# Patient Record
Sex: MALE | Race: BLACK OR AFRICAN AMERICAN | URBAN - METROPOLITAN AREA
[De-identification: names, ages, dates, MRNs, and addresses within clinical notes are randomized per-mention and may not be internally consistent; named-entity substitution may affect disease eponyms.]

---

## 2017-03-06 ENCOUNTER — OFFICE VISIT (OUTPATIENT)
Dept: FAMILY MEDICINE CLINIC | Age: 47
End: 2017-03-06

## 2017-03-06 VITALS
HEART RATE: 81 BPM | TEMPERATURE: 98 F | RESPIRATION RATE: 16 BRPM | HEIGHT: 74 IN | BODY MASS INDEX: 33.3 KG/M2 | WEIGHT: 259.5 LBS | DIASTOLIC BLOOD PRESSURE: 98 MMHG | OXYGEN SATURATION: 96 % | SYSTOLIC BLOOD PRESSURE: 139 MMHG

## 2017-03-06 DIAGNOSIS — R73.9 ELEVATED BLOOD SUGAR: ICD-10-CM

## 2017-03-06 DIAGNOSIS — E78.2 MIXED HYPERLIPIDEMIA: ICD-10-CM

## 2017-03-06 DIAGNOSIS — I10 ESSENTIAL HYPERTENSION: ICD-10-CM

## 2017-03-06 DIAGNOSIS — Z00.00 ROUTINE GENERAL MEDICAL EXAMINATION AT A HEALTH CARE FACILITY: Primary | ICD-10-CM

## 2017-03-06 NOTE — MR AVS SNAPSHOT
Visit Information Date & Time Provider Department Dept. Phone Encounter #  
 3/6/2017  8:00 AM Sherwin Albert MD [address] [facility] [phone] [id number] Follow-up Instructions Return in about 1 year (around 3/6/2018). Upcoming Health Maintenance Date Due DTaP/Tdap/Td series (1 - Tdap) 7/31/1990 INFLUENZA AGE 9 TO ADULT 8/1/2016 Allergies as of 3/6/2017  Review Complete On: 3/6/2017 By: Sherwin Albert MD  
 No Known Allergies Current Immunizations  Never Reviewed No immunizations on file. Not reviewed this visit You Were Diagnosed With   
  
 Codes Comments Routine general medical examination at a health care facility    -  Primary ICD-10-CM: Z00.00 ICD-9-CM: V70.0 Essential hypertension     ICD-10-CM: I10 
ICD-9-CM: 401.9 Mixed hyperlipidemia     ICD-10-CM: E78.2 ICD-9-CM: 272.2 Elevated blood sugar     ICD-10-CM: R73.9 ICD-9-CM: 790.29 Vitals BP Pulse Temp Resp Height(growth percentile) Weight(growth percentile) (!) 139/98 (BP 1 Location: Left arm, BP Patient Position: Sitting) 81 98 °F (36.7 °C) (Oral) 16 6' 1.83\" (1.875 m) 259 lb 8 oz (117.7 kg) SpO2 BMI Smoking Status 96% 33.47 kg/m2 Never Smoker BMI and BSA Data Body Mass Index Body Surface Area  
 33.47 kg/m 2 2.48 m 2 Preferred Pharmacy Pharmacy Name Phone [id number] [place] [phone] Your Updated Medication List  
  
   
This list is accurate as of: 3/6/17  8:41 AM.  Always use your most recent med list.  
  
  
  
  
 lisinopril-hydroCHLOROthiazide 20-12.5 mg per tablet Commonly known as:  PRINZIDE, ZESTORETIC  
TAKE 1 TAB BY MOUTH DAILY. We Performed the Following CBC WITH AUTOMATED DIFF [23386 CPT(R)] HEMOGLOBIN A1C WITH EAG [92273 CPT(R)] LIPID PANEL [86769 CPT(R)] METABOLIC PANEL, COMPREHENSIVE [67838 CPT(R)] PROSTATE SPECIFIC AG (PSA) [id number] CPT(R)] TSH 3RD GENERATION [74675 CPT(R)] Follow-up Instructions Return in about 1 year (around 3/6/2018). Introducing [facility]! [employer] introduces Are You a Human patient portal. Now you can access parts of your medical record, email your doctor's office, and request medication refills online. 1. In your internet browser, go to [email or web address] 2. Click on the First Time User? Click Here link in the Sign In box. You will see the New Member Sign Up page. 3. Enter your Are You a Human Access Code exactly as it appears below. You will not need to use this code after youve completed the sign-up process. If you do not sign up before the expiration date, you must request a new code. · Are You a Human Access Code: [email or web address] Expires: 6/4/2017  8:27 AM 
 
4. Enter the last four digits of your Social Security Number (xxxx) and Date of Birth (mm/dd/yyyy) as indicated and click Submit. You will be taken to the next sign-up page. 5. Create a Are You a Human ID. This will be your Are You a Human login ID and cannot be changed, so think of one that is secure and easy to remember. 6. Create a Are You a Human password. You can change your password at any time. 7. Enter your Password Reset Question and Answer. This can be used at a later time if you forget your password. 8. Enter your e-mail address. You will receive e-mail notification when new information is available in [address] 9. Click Sign Up. You can now view and download portions of your medical record. 10. Click the Download Summary menu link to download a portable copy of your medical information. If you have questions, please visit the Frequently Asked Questions section of the Are You a Human website. Remember, Are You a Human is NOT to be used for urgent needs. For medical emergencies, dial 911. Now available from your iPhone and Android! Please provide this summary of care documentation to your next provider. Your primary care clinician is listed as TERRIE DIETRICH. If you have any questions after today's visit, please call [phone].

## 2017-03-06 NOTE — PROGRESS NOTES
Patient is here for a physical and f/u on b/p. Visit Vitals    BP (!) 139/98 (BP 1 Location: Left arm, BP Patient Position: Sitting)    Pulse 81    Temp 98 °F (36.7 °C) (Oral)    Resp 16    Ht 6' 1.83\" (1.875 m)    Wt 259 lb 8 oz (117.7 kg)    SpO2 96%    BMI 33.47 kg/m2     1. Have you been to the ER, urgent care clinic since your last visit? Hospitalized since your last visit? No    2. Have you seen or consulted any other health care providers outside of the [address] [place] [address] since your last visit? Include any pap smears or colon screening. No       Chief Complaint   Patient presents with    Complete Physical     he is a 51y.o. year old male who presents for evalution. Reviewed PmHx, RxHx, FmHx, SocHx, AllgHx and updated and dated in the chart.     Patient Active Problem List    Diagnosis    Elevated blood sugar    HTN (hypertension)    Mixed hyperlipidemia       Review of Systems - negative except as listed above in the HPI    Objective:     Vitals:    03/06/17 0825   BP: (!) 139/98   Pulse: 81   Resp: 16   Temp: 98 °F (36.7 °C)   TempSrc: Oral   SpO2: 96%   Weight: 259 lb 8 oz (117.7 kg)   Height: 6' 1.83\" (1.875 m)     Physical Examination: General appearance - alert, well appearing, and in no distress  Neck - supple, no significant adenopathy  Chest - clear to auscultation, no wheezes, rales or rhonchi, symmetric air entry  Heart - normal rate, regular rhythm, normal S1, S2, no murmurs, rubs, clicks or gallops  Abdomen - soft, nontender, nondistended, no masses or organomegaly  Extremities - peripheral pulses normal, no pedal edema, no clubbing or cyanosis    Assessment/ Plan:   Marli Portillo was seen today for complete physical.    Diagnoses and all orders for this visit:    Routine general medical examination at a health care facility  -     PROSTATE SPECIFIC AG  -     LIPID PANEL  -     METABOLIC PANEL, COMPREHENSIVE  -     CBC WITH AUTOMATED DIFF  -     TSH 3RD GENERATION    Essential hypertension  -     LIPID PANEL  -     METABOLIC PANEL, COMPREHENSIVE  - inc today  -check BP at home  -dwp dec salt in diet    Mixed hyperlipidemia  -     LIPID PANEL  -     METABOLIC PANEL, COMPREHENSIVE    Elevated blood sugar  -     HEMOGLOBIN A1C WITH EAG       -Patient is in good health  -Discussed with patient cancer risk factors and screens needed  -Patient needs a colonoscopy no  -Labs from previous visits were discussed with patient yes  -Discussed with patient diet and exercise=yes  -Discussed with patient testicular (male)/breast self exam (female)= yes    Follow-up Disposition:  Return in about 1 year (around 3/6/2018). I have discussed the diagnosis with the patient and the intended plan as seen in the above orders. The patient understands and agrees with the plan. The patient has received an after-visit summary and questions were answered concerning future plans. Medication Side Effects and Warnings were discussed with patient  Patient Labs were reviewed and or requested  Patient Past Records were reviewed and or requested     There are no Patient Instructions on file for this visit.         Christine Pop M.D.

## 2017-03-07 LAB
ALBUMIN SERPL-MCNC: 4 G/DL (ref 3.5–5.5)
ALBUMIN/GLOB SERPL: 1.3 {RATIO} (ref 1.1–2.5)
ALP SERPL-CCNC: 118 IU/L (ref 39–117)
ALT SERPL-CCNC: 35 IU/L (ref 0–44)
AST SERPL-CCNC: 24 IU/L (ref 0–40)
BASOPHILS # BLD AUTO: 0 X10E3/UL (ref 0–0.2)
BASOPHILS NFR BLD AUTO: 0 %
BILIRUB SERPL-MCNC: 0.3 MG/DL (ref 0–1.2)
BUN SERPL-MCNC: 13 MG/DL (ref 6–24)
BUN/CREAT SERPL: 11 (ref 9–20)
CALCIUM SERPL-MCNC: 9.1 MG/DL (ref 8.7–10.2)
CHLORIDE SERPL-SCNC: 103 MMOL/L (ref 96–106)
CHOLEST SERPL-MCNC: 226 MG/DL (ref 100–199)
CO2 SERPL-SCNC: 24 MMOL/L (ref 18–29)
CREAT SERPL-MCNC: 1.23 MG/DL (ref 0.76–1.27)
EOSINOPHIL # BLD AUTO: 0.1 X10E3/UL (ref 0–0.4)
EOSINOPHIL NFR BLD AUTO: 1 %
ERYTHROCYTE [DISTWIDTH] IN BLOOD BY AUTOMATED COUNT: 13.4 % (ref 12.3–15.4)
EST. AVERAGE GLUCOSE BLD GHB EST-MCNC: 128 MG/DL
GLOBULIN SER CALC-MCNC: 3 G/DL (ref 1.5–4.5)
GLUCOSE SERPL-MCNC: 117 MG/DL (ref 65–99)
HBA1C MFR BLD: 6.1 % (ref 4.8–5.6)
HCT VFR BLD AUTO: 40.4 % (ref 37.5–51)
HDLC SERPL-MCNC: 47 MG/DL
HGB BLD-MCNC: 13.3 G/DL (ref 12.6–17.7)
IMM GRANULOCYTES # BLD: 0 X10E3/UL (ref 0–0.1)
IMM GRANULOCYTES NFR BLD: 0 %
INTERPRETATION, 910389: NORMAL
LDLC SERPL CALC-MCNC: 156 MG/DL (ref 0–99)
LYMPHOCYTES # BLD AUTO: 2.4 X10E3/UL (ref 0.7–3.1)
LYMPHOCYTES NFR BLD AUTO: 34 %
MCH RBC QN AUTO: 27.9 PG (ref 26.6–33)
MCHC RBC AUTO-ENTMCNC: 32.9 G/DL (ref 31.5–35.7)
MCV RBC AUTO: 85 FL (ref 79–97)
MONOCYTES # BLD AUTO: 0.6 X10E3/UL (ref 0.1–0.9)
MONOCYTES NFR BLD AUTO: 9 %
NEUTROPHILS # BLD AUTO: 4 X10E3/UL (ref 1.4–7)
NEUTROPHILS NFR BLD AUTO: 56 %
PLATELET # BLD AUTO: 245 X10E3/UL (ref 150–379)
POTASSIUM SERPL-SCNC: 4.1 MMOL/L (ref 3.5–5.2)
PROT SERPL-MCNC: 7 G/DL (ref 6–8.5)
PSA SERPL-MCNC: 0.7 NG/ML (ref 0–4)
RBC # BLD AUTO: 4.77 X10E6/UL (ref 4.14–5.8)
SODIUM SERPL-SCNC: 144 MMOL/L (ref 134–144)
TRIGL SERPL-MCNC: 114 MG/DL (ref 0–149)
TSH SERPL DL<=0.005 MIU/L-ACNC: 2.42 UIU/ML (ref 0.45–4.5)
VLDLC SERPL CALC-MCNC: 23 MG/DL (ref 5–40)
WBC # BLD AUTO: 7.2 X10E3/UL (ref 3.4–10.8)

## 2017-12-02 DIAGNOSIS — I10 ESSENTIAL HYPERTENSION: ICD-10-CM

## 2017-12-04 RX ORDER — LISINOPRIL AND HYDROCHLOROTHIAZIDE 12.5; 2 MG/1; MG/1
TABLET ORAL
Qty: 90 TAB | Refills: 3 | Status: SHIPPED | OUTPATIENT
Start: 2017-12-04 | End: 2018-12-17 | Stop reason: SDUPTHER

## 2018-12-17 DIAGNOSIS — I10 ESSENTIAL HYPERTENSION: ICD-10-CM

## 2018-12-17 RX ORDER — LISINOPRIL AND HYDROCHLOROTHIAZIDE 12.5; 2 MG/1; MG/1
TABLET ORAL
Qty: 90 TAB | Refills: 3 | Status: SHIPPED | OUTPATIENT
Start: 2018-12-17 | End: 2020-03-17

## 2018-12-17 RX ORDER — LISINOPRIL AND HYDROCHLOROTHIAZIDE 12.5; 2 MG/1; MG/1
TABLET ORAL
Qty: 90 TAB | Refills: 3 | Status: SHIPPED | OUTPATIENT
Start: 2018-12-17 | End: 2019-04-14 | Stop reason: SDUPTHER

## 2019-05-23 ENCOUNTER — OFFICE VISIT (OUTPATIENT)
Dept: FAMILY MEDICINE CLINIC | Age: 49
End: 2019-05-23

## 2019-05-23 VITALS
OXYGEN SATURATION: 94 % | TEMPERATURE: 98 F | RESPIRATION RATE: 18 BRPM | DIASTOLIC BLOOD PRESSURE: 80 MMHG | BODY MASS INDEX: 34.93 KG/M2 | SYSTOLIC BLOOD PRESSURE: 123 MMHG | WEIGHT: 257.9 LBS | HEART RATE: 97 BPM | HEIGHT: 72 IN

## 2019-05-23 DIAGNOSIS — M54.5 ACUTE LOW BACK PAIN, UNSPECIFIED BACK PAIN LATERALITY, WITH SCIATICA PRESENCE UNSPECIFIED: ICD-10-CM

## 2019-05-23 DIAGNOSIS — E78.2 MIXED HYPERLIPIDEMIA: ICD-10-CM

## 2019-05-23 DIAGNOSIS — R73.9 ELEVATED BLOOD SUGAR: Primary | ICD-10-CM

## 2019-05-23 DIAGNOSIS — I10 ESSENTIAL HYPERTENSION: ICD-10-CM

## 2019-05-23 RX ORDER — NAPROXEN 500 MG/1
500 TABLET ORAL 2 TIMES DAILY WITH MEALS
Qty: 60 TAB | Refills: 2 | Status: SHIPPED | OUTPATIENT
Start: 2019-05-23

## 2019-05-23 NOTE — PROGRESS NOTES
Pt presents in office for routine f/u and back pain. Due for labs        Chief Complaint   Patient presents with    Back Pain     He is a 51 y.o. male who presents for evalution. Reviewed PmHx, RxHx, FmHx, SocHx, AllgHx and updated and dated in the chart. Patient Active Problem List    Diagnosis    Elevated blood sugar    HTN (hypertension)    Mixed hyperlipidemia       Review of Systems - negative except as listed above in the HPI    Objective:     Vitals:    05/23/19 1436   BP: 123/80   Pulse: 97   Resp: 18   Temp: 98 °F (36.7 °C)   TempSrc: Oral   SpO2: 94%   Weight: 257 lb 14.4 oz (117 kg)   Height: 6' (1.829 m)     Physical Examination: General appearance - alert, well appearing, and in no distress  Neck - supple, no significant adenopathy  Chest - clear to auscultation, no wheezes, rales or rhonchi, symmetric air entry  Heart - normal rate, regular rhythm, normal S1, S2, no murmurs, rubs, clicks or gallops  Abdomen - soft, nontender, nondistended, no masses or organomegaly  Extremities - peripheral pulses normal, no pedal edema, no clubbing or cyanosis    Assessment/ Plan:   Diagnoses and all orders for this visit:    1. Elevated blood sugar  -     LIPID PANEL  -     METABOLIC PANEL, COMPREHENSIVE  -     HEMOGLOBIN A1C WITH EAG  -dwp diet and wt loss    2. Essential hypertension  -     LIPID PANEL  -     METABOLIC PANEL, COMPREHENSIVE  -at goal    3. Mixed hyperlipidemia  -     LIPID PANEL  -     METABOLIC PANEL, COMPREHENSIVE    4. Acute low back pain, unspecified back pain laterality, with sciatica presence unspecified  -     naproxen (NAPROSYN) 500 mg tablet; Take 1 Tab by mouth two (2) times daily (with meals). I have discussed the diagnosis with the patient and the intended plan as seen in the above orders. The patient understands and agrees with the plan. The patient has received an after-visit summary and questions were answered concerning future plans.      Medication Side Effects and Warnings were discussed with patient  Patient Labs were reviewed and or requested:  Patient Past Records were reviewed and or requested    Bedelia Schaumann, M.D. There are no Patient Instructions on file for this visit.

## 2019-05-24 LAB
ALBUMIN SERPL-MCNC: 4.5 G/DL (ref 3.5–5.5)
ALBUMIN/GLOB SERPL: 1.6 {RATIO} (ref 1.2–2.2)
ALP SERPL-CCNC: 106 IU/L (ref 39–117)
ALT SERPL-CCNC: 27 IU/L (ref 0–44)
AST SERPL-CCNC: 21 IU/L (ref 0–40)
BILIRUB SERPL-MCNC: 0.5 MG/DL (ref 0–1.2)
BUN SERPL-MCNC: 14 MG/DL (ref 6–24)
BUN/CREAT SERPL: 12 (ref 9–20)
CALCIUM SERPL-MCNC: 9.7 MG/DL (ref 8.7–10.2)
CHLORIDE SERPL-SCNC: 102 MMOL/L (ref 96–106)
CHOLEST SERPL-MCNC: 238 MG/DL (ref 100–199)
CO2 SERPL-SCNC: 21 MMOL/L (ref 20–29)
CREAT SERPL-MCNC: 1.18 MG/DL (ref 0.76–1.27)
EST. AVERAGE GLUCOSE BLD GHB EST-MCNC: 120 MG/DL
GLOBULIN SER CALC-MCNC: 2.8 G/DL (ref 1.5–4.5)
GLUCOSE SERPL-MCNC: 109 MG/DL (ref 65–99)
HBA1C MFR BLD: 5.8 % (ref 4.8–5.6)
HDLC SERPL-MCNC: 42 MG/DL
INTERPRETATION, 910389: NORMAL
LDLC SERPL CALC-MCNC: 166 MG/DL (ref 0–99)
POTASSIUM SERPL-SCNC: 3.8 MMOL/L (ref 3.5–5.2)
PROT SERPL-MCNC: 7.3 G/DL (ref 6–8.5)
SODIUM SERPL-SCNC: 138 MMOL/L (ref 134–144)
TRIGL SERPL-MCNC: 151 MG/DL (ref 0–149)
VLDLC SERPL CALC-MCNC: 30 MG/DL (ref 5–40)

## 2020-03-14 DIAGNOSIS — I10 ESSENTIAL HYPERTENSION: ICD-10-CM

## 2020-03-17 RX ORDER — LISINOPRIL AND HYDROCHLOROTHIAZIDE 12.5; 2 MG/1; MG/1
TABLET ORAL
Qty: 90 TAB | Refills: 3 | Status: SHIPPED | OUTPATIENT
Start: 2020-03-17 | End: 2021-05-27

## 2021-09-26 DIAGNOSIS — I10 ESSENTIAL HYPERTENSION: ICD-10-CM

## 2021-09-27 RX ORDER — LISINOPRIL AND HYDROCHLOROTHIAZIDE 12.5; 2 MG/1; MG/1
TABLET ORAL
Qty: 90 TABLET | Refills: 0 | Status: SHIPPED | OUTPATIENT
Start: 2021-09-27 | End: 2021-12-29

## 2022-01-06 ENCOUNTER — TELEPHONE (OUTPATIENT)
Dept: FAMILY MEDICINE CLINIC | Age: 52
End: 2022-01-06

## 2022-01-06 NOTE — TELEPHONE ENCOUNTER
----- Message from Juan José Dale sent at 1/6/2022 12:07 PM EST -----  Subject: Message to Provider    QUESTIONS  Information for Provider? Pt was positive for COVID 19 12/28/21, he is   wanting to know if he can get scheduled for another test to see if hes   negative so he can go back to work. Please contact him ASAP  ---------------------------------------------------------------------------  --------------  CALL BACK INFO  What is the best way for the office to contact you? OK to leave message on   voicemail  Preferred Call Back Phone Number? [phone]  ---------------------------------------------------------------------------  --------------  SCRIPT ANSWERS  Relationship to Patient?  Self

## 2022-01-24 ENCOUNTER — OFFICE VISIT (OUTPATIENT)
Dept: FAMILY MEDICINE CLINIC | Age: 52
End: 2022-01-24

## 2022-01-24 VITALS
TEMPERATURE: 99.1 F | SYSTOLIC BLOOD PRESSURE: 131 MMHG | DIASTOLIC BLOOD PRESSURE: 84 MMHG | BODY MASS INDEX: 34.54 KG/M2 | WEIGHT: 255 LBS | OXYGEN SATURATION: 96 % | RESPIRATION RATE: 16 BRPM | HEIGHT: 72 IN | HEART RATE: 81 BPM

## 2022-01-24 DIAGNOSIS — Z00.00 ROUTINE GENERAL MEDICAL EXAMINATION AT A HEALTH CARE FACILITY: Primary | ICD-10-CM

## 2022-01-24 DIAGNOSIS — R73.9 ELEVATED BLOOD SUGAR: ICD-10-CM

## 2022-01-24 DIAGNOSIS — I10 ESSENTIAL HYPERTENSION: ICD-10-CM

## 2022-01-24 DIAGNOSIS — E78.2 MIXED HYPERLIPIDEMIA: ICD-10-CM

## 2022-01-24 DIAGNOSIS — I10 PRIMARY HYPERTENSION: ICD-10-CM

## 2022-01-24 PROCEDURE — 99396 PREV VISIT EST AGE 40-64: CPT | Performed by: FAMILY MEDICINE

## 2022-01-24 RX ORDER — LISINOPRIL AND HYDROCHLOROTHIAZIDE 12.5; 2 MG/1; MG/1
TABLET ORAL
Qty: 90 TABLET | Refills: 3 | Status: SHIPPED | OUTPATIENT
Start: 2022-01-24 | End: 2022-02-04

## 2022-01-24 NOTE — PROGRESS NOTES
Chief Complaint   Patient presents with    Physical    Hypertension    Labs     Fasting today    Medication Refill     1. Have you been to the ER, urgent care clinic since your last visit? Hospitalized since your last visit? No    2. Have you seen or consulted any other health care providers outside of the [address] [place] [address] since your last visit? Include any pap smears or colon screening. No      Chief Complaint   Patient presents with    Physical    Hypertension    Labs     Fasting today    Medication Refill     he is a 45y.o. year old male who presents for evalution. Reviewed PmHx, RxHx, FmHx, SocHx, AllgHx and updated and dated in the chart. Patient Active Problem List    Diagnosis    Elevated blood sugar    HTN (hypertension)    Mixed hyperlipidemia       Review of Systems - negative except as listed above in the HPI    Objective:     Vitals:    01/24/22 1345   BP: 131/84   Pulse: 81   Resp: 16   Temp: 99.1 °F (37.3 °C)   TempSrc: Oral   SpO2: 96%   Weight: 255 lb (115.7 kg)   Height: 6' (1.829 m)     Physical Examination: General appearance - alert, well appearing, and in no distress  Chest - clear to auscultation, no wheezes, rales or rhonchi, symmetric air entry  Heart - normal rate, regular rhythm, normal S1, S2, no murmurs, rubs, clicks or gallops  Abdomen - soft, nontender, nondistended, no masses or organomegaly    Assessment/ Plan:   Diagnoses and all orders for this visit:    1. Routine general medical examination at a health care facility  -     LIPID PANEL; Future  -     METABOLIC PANEL, COMPREHENSIVE; Future  -     CBC WITH AUTOMATED DIFF; Future  -     TSH 3RD GENERATION; Future  -     HEMOGLOBIN A1C WITH EAG; Future  -     PSA W/ REFLX FREE PSA; Future  -see below    2. Primary hypertension  -     LIPID PANEL; Future  -     METABOLIC PANEL, COMPREHENSIVE; Future  -at goal    3. Mixed hyperlipidemia  -     LIPID PANEL;  Future  -     METABOLIC PANEL, COMPREHENSIVE; Future    4. Elevated blood sugar  -     METABOLIC PANEL, COMPREHENSIVE; Future  -     HEMOGLOBIN A1C WITH EAG; Future    5. Essential hypertension  -     lisinopril-hydroCHLOROthiazide (PRINZIDE, ZESTORETIC) 20-12.5 mg per tablet; TAKE 1 TABLET BY MOUTH EVERY DAY. -Patient is in good health  -Discussed with patient cancer risk factors and screens needed  -Colonoscopy was recommended based on current guidelines for screening.  -Labs from previous visits were discussed with patient yes      -Discussed with patient diet and exercise  -Immunizations appropriate for age were discussed with pt and updated  -    I have discussed the diagnosis with the patient and the intended plan as seen in the above orders. The patient understands and agrees with the plan. The patient has received an after-visit summary and questions were answered concerning future plans. Medication Side Effects and Warnings were discussed with patient  Patient Labs were reviewed and or requested  Patient Past Records were reviewed and or requested     There are no Patient Instructions on file for this visit.         Arnaud Avalos M.D.

## 2022-01-25 PROBLEM — E11.9 CONTROLLED TYPE 2 DIABETES MELLITUS WITHOUT COMPLICATION, WITHOUT LONG-TERM CURRENT USE OF INSULIN (HCC): Chronic | Status: ACTIVE | Noted: 2022-01-25

## 2022-01-25 LAB
ALBUMIN SERPL-MCNC: 4.4 G/DL (ref 3.8–4.9)
ALBUMIN/GLOB SERPL: 1.7 {RATIO} (ref 1.2–2.2)
ALP SERPL-CCNC: 142 IU/L (ref 44–121)
ALT SERPL-CCNC: 60 IU/L (ref 0–44)
AST SERPL-CCNC: 33 IU/L (ref 0–40)
BASOPHILS # BLD AUTO: 0 X10E3/UL (ref 0–0.2)
BASOPHILS NFR BLD AUTO: 1 %
BILIRUB SERPL-MCNC: 0.5 MG/DL (ref 0–1.2)
BUN SERPL-MCNC: 14 MG/DL (ref 6–24)
BUN/CREAT SERPL: 12 (ref 9–20)
CALCIUM SERPL-MCNC: 9.6 MG/DL (ref 8.7–10.2)
CHLORIDE SERPL-SCNC: 101 MMOL/L (ref 96–106)
CHOLEST SERPL-MCNC: 261 MG/DL (ref 100–199)
CO2 SERPL-SCNC: 27 MMOL/L (ref 20–29)
CREAT SERPL-MCNC: 1.19 MG/DL (ref 0.76–1.27)
EOSINOPHIL # BLD AUTO: 0.1 X10E3/UL (ref 0–0.4)
EOSINOPHIL NFR BLD AUTO: 1 %
ERYTHROCYTE [DISTWIDTH] IN BLOOD BY AUTOMATED COUNT: 12.9 % (ref 11.6–15.4)
EST. AVERAGE GLUCOSE BLD GHB EST-MCNC: 126 MG/DL
GLOBULIN SER CALC-MCNC: 2.6 G/DL (ref 1.5–4.5)
GLUCOSE SERPL-MCNC: 97 MG/DL (ref 65–99)
HBA1C MFR BLD: 6 % (ref 4.8–5.6)
HCT VFR BLD AUTO: 42.4 % (ref 37.5–51)
HDLC SERPL-MCNC: 43 MG/DL
HGB BLD-MCNC: 14.4 G/DL (ref 13–17.7)
IMM GRANULOCYTES # BLD AUTO: 0 X10E3/UL (ref 0–0.1)
IMM GRANULOCYTES NFR BLD AUTO: 0 %
IMP & REVIEW OF LAB RESULTS: NORMAL
LDLC SERPL CALC-MCNC: 188 MG/DL (ref 0–99)
LYMPHOCYTES # BLD AUTO: 2.4 X10E3/UL (ref 0.7–3.1)
LYMPHOCYTES NFR BLD AUTO: 33 %
MCH RBC QN AUTO: 29 PG (ref 26.6–33)
MCHC RBC AUTO-ENTMCNC: 34 G/DL (ref 31.5–35.7)
MCV RBC AUTO: 86 FL (ref 79–97)
MONOCYTES # BLD AUTO: 0.8 X10E3/UL (ref 0.1–0.9)
MONOCYTES NFR BLD AUTO: 11 %
NEUTROPHILS # BLD AUTO: 4 X10E3/UL (ref 1.4–7)
NEUTROPHILS NFR BLD AUTO: 54 %
PLATELET # BLD AUTO: 233 X10E3/UL (ref 150–450)
POTASSIUM SERPL-SCNC: 4.2 MMOL/L (ref 3.5–5.2)
PROT SERPL-MCNC: 7 G/DL (ref 6–8.5)
PSA SERPL-MCNC: 1.3 NG/ML (ref 0–4)
RBC # BLD AUTO: 4.96 X10E6/UL (ref 4.14–5.8)
REFLEX CRITERIA: NORMAL
SODIUM SERPL-SCNC: 140 MMOL/L (ref 134–144)
TRIGL SERPL-MCNC: 161 MG/DL (ref 0–149)
TSH SERPL-ACNC: 1.76 UIU/ML (ref 0.45–4.5)
VLDLC SERPL CALC-MCNC: 30 MG/DL (ref 5–40)
WBC # BLD AUTO: 7.3 X10E3/UL (ref 3.4–10.8)

## 2022-03-18 PROBLEM — R73.9 ELEVATED BLOOD SUGAR: Status: ACTIVE | Noted: 2017-03-06

## 2022-03-19 PROBLEM — E11.9 CONTROLLED TYPE 2 DIABETES MELLITUS WITHOUT COMPLICATION, WITHOUT LONG-TERM CURRENT USE OF INSULIN (HCC): Status: ACTIVE | Noted: 2022-01-25

## 2022-10-07 DIAGNOSIS — I10 ESSENTIAL HYPERTENSION: ICD-10-CM

## 2022-10-07 RX ORDER — LISINOPRIL AND HYDROCHLOROTHIAZIDE 12.5; 2 MG/1; MG/1
TABLET ORAL
Qty: 90 TABLET | Refills: 1 | Status: SHIPPED | OUTPATIENT
Start: 2022-10-07

## 2022-11-09 ENCOUNTER — TELEPHONE (OUTPATIENT)
Dept: FAMILY MEDICINE CLINIC | Age: 52
End: 2022-11-09

## 2022-11-09 NOTE — TELEPHONE ENCOUNTER
----- Message from Wayne Marques sent at 11/9/2022 11:08 AM EST -----  Subject: Referral Request    Reason for referral request? pt wants a referral to get an MRI to check   for an ansurism family has a history also has been having hip pain when he   walks   Provider patient wants to be referred to(if known):     Provider Phone Number(if known):     Additional Information for Provider?   ---------------------------------------------------------------------------  --------------  [email or web address]    [phone]; OK to leave message on voicemail, OK to respond with   electronic message via [employer] portal (only for patients who have   registered [employer] account)  ---------------------------------------------------------------------------  --------------

## 2022-11-22 ENCOUNTER — OFFICE VISIT (OUTPATIENT)
Dept: FAMILY MEDICINE CLINIC | Age: 52
End: 2022-11-22
Payer: COMMERCIAL

## 2022-11-22 VITALS
OXYGEN SATURATION: 95 % | HEIGHT: 72 IN | TEMPERATURE: 97.9 F | BODY MASS INDEX: 35.49 KG/M2 | RESPIRATION RATE: 18 BRPM | HEART RATE: 78 BPM | SYSTOLIC BLOOD PRESSURE: 138 MMHG | WEIGHT: 262 LBS | DIASTOLIC BLOOD PRESSURE: 89 MMHG

## 2022-11-22 DIAGNOSIS — M25.552 BILATERAL HIP PAIN: ICD-10-CM

## 2022-11-22 DIAGNOSIS — M25.551 BILATERAL HIP PAIN: ICD-10-CM

## 2022-11-22 DIAGNOSIS — E11.9 CONTROLLED TYPE 2 DIABETES MELLITUS WITHOUT COMPLICATION, WITHOUT LONG-TERM CURRENT USE OF INSULIN (HCC): Primary | ICD-10-CM

## 2022-11-22 DIAGNOSIS — Z82.49 FAMILY HISTORY OF CEREBRAL ANEURYSM: ICD-10-CM

## 2022-11-22 DIAGNOSIS — E78.2 MIXED HYPERLIPIDEMIA: ICD-10-CM

## 2022-11-22 DIAGNOSIS — E66.01 SEVERE OBESITY (BMI 35.0-39.9) WITH COMORBIDITY (HCC): ICD-10-CM

## 2022-11-22 DIAGNOSIS — I10 PRIMARY HYPERTENSION: ICD-10-CM

## 2022-11-22 PROCEDURE — 99214 OFFICE O/P EST MOD 30 MIN: CPT | Performed by: FAMILY MEDICINE

## 2022-11-22 PROCEDURE — 3079F DIAST BP 80-89 MM HG: CPT | Performed by: FAMILY MEDICINE

## 2022-11-22 PROCEDURE — 3074F SYST BP LT 130 MM HG: CPT | Performed by: FAMILY MEDICINE

## 2022-11-22 PROCEDURE — 3044F HG A1C LEVEL LT 7.0%: CPT | Performed by: FAMILY MEDICINE

## 2022-11-22 NOTE — PROGRESS NOTES
Chief Complaint   Patient presents with    Hip Pain     Bilat      Hypertension    Cholesterol Problem    Referral Request     MRI for family hx aneurism      Diabetes     1. Have you been to the ER, urgent care clinic since your last visit? Hospitalized since your last visit? No    2. Have you seen or consulted any other health care providers outside of the [address] [place] [address] since your last visit? Include any pap smears or colon screening. No    No results found for: Caryle Saltness, MCA2, MCA3, MCAU   Chief Complaint   Patient presents with    Hip Pain     Bilat      Hypertension    Cholesterol Problem    Referral Request     MRI for family hx aneurism      Diabetes     he is a 47y.o. year old male who presents for evaluation. See Diabetic Report Card listed above. Patient Active Problem List    Diagnosis    Controlled type 2 diabetes mellitus without complication, without long-term current use of insulin (HCC)    Elevated blood sugar    HTN (hypertension)    Mixed hyperlipidemia       Reviewed PmHx, RxHx, FmHx, SocHx, AllgHx--dated and updated in the chart. Review of Systems - negative except as listed above in the HPI    Objective:     Vitals:    11/22/22 0903   BP: 138/89   Pulse: 78   Resp: 18   Temp: 97.9 °F (36.6 °C)   SpO2: 95%   Weight: 262 lb (118.8 kg)   Height: 6' (1.829 m)         Assessment/ Plan:   Diagnoses and all orders for this visit:    1. Controlled type 2 diabetes mellitus without complication, without long-term current use of insulin (HCC)  -     MICROALBUMIN, UR, RAND W/ MICROALB/CREAT RATIO; Future  -     LIPID PANEL; Future  -     METABOLIC PANEL, COMPREHENSIVE; Future  -     CBC WITH AUTOMATED DIFF; Future  -     TSH 3RD GENERATION; Future  -     HEMOGLOBIN A1C WITH EAG; Future  At goal  2. Severe obesity (BMI 35.0-39. 9) with comorbidity ([facility] [address]  DWP diet  3. Primary hypertension  -     LIPID PANEL; Future  -     METABOLIC PANEL, COMPREHENSIVE; Future  At goal  4.  Mixed hyperlipidemia  -     LIPID PANEL; Future  -     METABOLIC PANEL, COMPREHENSIVE; Future    5. Family history of cerebral aneurysm  -     MRA BRAIN WO CONT; Future    6. Bilateral hip pain  -     XR HIPS BI W OR WO AP PELV; Future  Negative hip exam, discussed patient need for stretching exercises. Lab Results   Component Value Date/Time    Cholesterol, total 261 (H) 01/24/2022 12:00 AM    HDL Cholesterol 43 01/24/2022 12:00 AM    LDL, calculated 188 (H) 01/24/2022 12:00 AM    LDL, calculated 166 (H) 05/23/2019 02:55 PM    Triglyceride 161 (H) 01/24/2022 12:00 AM     Lab Results   Component Value Date/Time    Hemoglobin A1c 6.0 (H) 01/24/2022 12:00 AM    Hemoglobin A1c 5.8 (H) 05/23/2019 02:55 PM    Hemoglobin A1c 6.1 (H) 03/06/2017 08:44 AM    LDL, calculated 188 (H) 01/24/2022 12:00 AM    LDL, calculated 166 (H) 05/23/2019 02:55 PM    Creatinine 1.19 01/24/2022 12:00 AM          Discussed with patient goal of Diabetes to include:  HgA1C <7, LDL cholesterol <100, Blood pressure <140/80. Discussed with patient diet and weight management and to get regular exercise. Recommend yearly eye exams and daily foot care. The patient understands and agrees with the plan. I have discussed the diagnosis with the patient and the intended plan as seen in the above orders. The patient has received an after-visit summary and questions were answered concerning future plans. Medication Side Effects and Warnings were discussed with patient  Patient Labs were reviewed and or requested  Patient Past Records were reviewed and or requested    Catalina Shin M.D. [facility]    There are no Patient Instructions on file for this visit.

## 2022-11-22 NOTE — PROGRESS NOTES
Chief Complaint   Patient presents with    Hip Pain     Bilat      Hypertension    Cholesterol Problem    Referral Request     MRI for family hx aneurism      Diabetes     1. Have you been to the ER, urgent care clinic since your last visit? Hospitalized since your last visit? No    2. Have you seen or consulted any other health care providers outside of the [address] [place] [address] since your last visit? Include any pap smears or colon screening.  No

## 2022-11-23 LAB
ALBUMIN SERPL-MCNC: 4.1 G/DL (ref 3.8–4.9)
ALBUMIN/CREAT UR: 26 MG/G CREAT (ref 0–29)
ALBUMIN/GLOB SERPL: 1.8 {RATIO} (ref 1.2–2.2)
ALP SERPL-CCNC: 108 IU/L (ref 44–121)
ALT SERPL-CCNC: 24 IU/L (ref 0–44)
AST SERPL-CCNC: 21 IU/L (ref 0–40)
BASOPHILS # BLD AUTO: 0 X10E3/UL (ref 0–0.2)
BASOPHILS NFR BLD AUTO: 1 %
BILIRUB SERPL-MCNC: 0.5 MG/DL (ref 0–1.2)
BUN SERPL-MCNC: 13 MG/DL (ref 6–24)
BUN/CREAT SERPL: 10 (ref 9–20)
CALCIUM SERPL-MCNC: 9.1 MG/DL (ref 8.7–10.2)
CHLORIDE SERPL-SCNC: 102 MMOL/L (ref 96–106)
CHOLEST SERPL-MCNC: 226 MG/DL (ref 100–199)
CO2 SERPL-SCNC: 25 MMOL/L (ref 20–29)
CREAT SERPL-MCNC: 1.34 MG/DL (ref 0.76–1.27)
CREAT UR-MCNC: 171 MG/DL
EGFR: 63 ML/MIN/1.73
EOSINOPHIL # BLD AUTO: 0.1 X10E3/UL (ref 0–0.4)
EOSINOPHIL NFR BLD AUTO: 1 %
ERYTHROCYTE [DISTWIDTH] IN BLOOD BY AUTOMATED COUNT: 12.9 % (ref 11.6–15.4)
EST. AVERAGE GLUCOSE BLD GHB EST-MCNC: 128 MG/DL
GLOBULIN SER CALC-MCNC: 2.3 G/DL (ref 1.5–4.5)
GLUCOSE SERPL-MCNC: 113 MG/DL (ref 70–99)
HBA1C MFR BLD: 6.1 % (ref 4.8–5.6)
HCT VFR BLD AUTO: 41.5 % (ref 37.5–51)
HDLC SERPL-MCNC: 42 MG/DL
HGB BLD-MCNC: 14.1 G/DL (ref 13–17.7)
IMM GRANULOCYTES # BLD AUTO: 0 X10E3/UL (ref 0–0.1)
IMM GRANULOCYTES NFR BLD AUTO: 1 %
IMP & REVIEW OF LAB RESULTS: NORMAL
LDLC SERPL CALC-MCNC: 162 MG/DL (ref 0–99)
LYMPHOCYTES # BLD AUTO: 1.7 X10E3/UL (ref 0.7–3.1)
LYMPHOCYTES NFR BLD AUTO: 26 %
MCH RBC QN AUTO: 28.8 PG (ref 26.6–33)
MCHC RBC AUTO-ENTMCNC: 34 G/DL (ref 31.5–35.7)
MCV RBC AUTO: 85 FL (ref 79–97)
MICROALBUMIN UR-MCNC: 44.6 UG/ML
MONOCYTES # BLD AUTO: 0.6 X10E3/UL (ref 0.1–0.9)
MONOCYTES NFR BLD AUTO: 9 %
NEUTROPHILS # BLD AUTO: 4 X10E3/UL (ref 1.4–7)
NEUTROPHILS NFR BLD AUTO: 62 %
PLATELET # BLD AUTO: 268 X10E3/UL (ref 150–450)
POTASSIUM SERPL-SCNC: 4.1 MMOL/L (ref 3.5–5.2)
PROT SERPL-MCNC: 6.4 G/DL (ref 6–8.5)
RBC # BLD AUTO: 4.9 X10E6/UL (ref 4.14–5.8)
SODIUM SERPL-SCNC: 141 MMOL/L (ref 134–144)
TRIGL SERPL-MCNC: 120 MG/DL (ref 0–149)
TSH SERPL DL<=0.005 MIU/L-ACNC: 2.42 UIU/ML (ref 0.45–4.5)
VLDLC SERPL CALC-MCNC: 22 MG/DL (ref 5–40)
WBC # BLD AUTO: 6.4 X10E3/UL (ref 3.4–10.8)

## 2023-04-20 DIAGNOSIS — I10 ESSENTIAL HYPERTENSION: ICD-10-CM

## 2023-04-20 RX ORDER — LISINOPRIL AND HYDROCHLOROTHIAZIDE 12.5; 2 MG/1; MG/1
TABLET ORAL
Qty: 90 TABLET | Refills: 1 | Status: SHIPPED | OUTPATIENT
Start: 2023-04-20

## 2023-05-17 RX ORDER — LISINOPRIL AND HYDROCHLOROTHIAZIDE 20; 12.5 MG/1; MG/1
1 TABLET ORAL DAILY
COMMUNITY
Start: 2023-04-20

## 2023-11-03 DIAGNOSIS — I10 ESSENTIAL (PRIMARY) HYPERTENSION: ICD-10-CM

## 2023-11-06 RX ORDER — LISINOPRIL AND HYDROCHLOROTHIAZIDE 20; 12.5 MG/1; MG/1
1 TABLET ORAL DAILY
Qty: 90 TABLET | Refills: 1 | Status: SHIPPED | OUTPATIENT
Start: 2023-11-06

## 2024-03-25 ENCOUNTER — OFFICE VISIT (OUTPATIENT)
Age: 54
End: 2024-03-25
Payer: COMMERCIAL

## 2024-03-25 VITALS
RESPIRATION RATE: 18 BRPM | DIASTOLIC BLOOD PRESSURE: 83 MMHG | WEIGHT: 263.4 LBS | BODY MASS INDEX: 35.68 KG/M2 | HEIGHT: 72 IN | HEART RATE: 79 BPM | SYSTOLIC BLOOD PRESSURE: 134 MMHG | TEMPERATURE: 98.3 F | OXYGEN SATURATION: 98 %

## 2024-03-25 DIAGNOSIS — R31.0 GROSS HEMATURIA: Primary | ICD-10-CM

## 2024-03-25 LAB
BILIRUBIN, URINE, POC: NEGATIVE
BLOOD URINE, POC: NEGATIVE
GLUCOSE URINE, POC: NEGATIVE
KETONES, URINE, POC: NEGATIVE
LEUKOCYTE ESTERASE, URINE, POC: NEGATIVE
NITRITE, URINE, POC: NEGATIVE
PH, URINE, POC: 5.5 (ref 4.6–8)
PROTEIN,URINE, POC: NEGATIVE
SPECIFIC GRAVITY, URINE, POC: 1.01 (ref 1–1.03)
URINALYSIS CLARITY, POC: CLEAR
URINALYSIS COLOR, POC: YELLOW
UROBILINOGEN, POC: NORMAL

## 2024-03-25 PROCEDURE — 99213 OFFICE O/P EST LOW 20 MIN: CPT | Performed by: NURSE PRACTITIONER

## 2024-03-25 PROCEDURE — 3075F SYST BP GE 130 - 139MM HG: CPT | Performed by: NURSE PRACTITIONER

## 2024-03-25 PROCEDURE — 3079F DIAST BP 80-89 MM HG: CPT | Performed by: NURSE PRACTITIONER

## 2024-03-25 PROCEDURE — 81003 URINALYSIS AUTO W/O SCOPE: CPT | Performed by: NURSE PRACTITIONER

## 2024-03-25 SDOH — ECONOMIC STABILITY: HOUSING INSECURITY
IN THE LAST 12 MONTHS, WAS THERE A TIME WHEN YOU DID NOT HAVE A STEADY PLACE TO SLEEP OR SLEPT IN A SHELTER (INCLUDING NOW)?: NO

## 2024-03-25 SDOH — ECONOMIC STABILITY: FOOD INSECURITY: WITHIN THE PAST 12 MONTHS, THE FOOD YOU BOUGHT JUST DIDN'T LAST AND YOU DIDN'T HAVE MONEY TO GET MORE.: NEVER TRUE

## 2024-03-25 SDOH — ECONOMIC STABILITY: FOOD INSECURITY: WITHIN THE PAST 12 MONTHS, YOU WORRIED THAT YOUR FOOD WOULD RUN OUT BEFORE YOU GOT MONEY TO BUY MORE.: NEVER TRUE

## 2024-03-25 SDOH — ECONOMIC STABILITY: INCOME INSECURITY: HOW HARD IS IT FOR YOU TO PAY FOR THE VERY BASICS LIKE FOOD, HOUSING, MEDICAL CARE, AND HEATING?: NOT HARD AT ALL

## 2024-03-25 ASSESSMENT — PATIENT HEALTH QUESTIONNAIRE - PHQ9
1. LITTLE INTEREST OR PLEASURE IN DOING THINGS: NOT AT ALL
2. FEELING DOWN, DEPRESSED OR HOPELESS: NOT AT ALL
SUM OF ALL RESPONSES TO PHQ QUESTIONS 1-9: 0
SUM OF ALL RESPONSES TO PHQ9 QUESTIONS 1 & 2: 0
SUM OF ALL RESPONSES TO PHQ QUESTIONS 1-9: 0

## 2024-03-25 NOTE — PROGRESS NOTES
Subjective:      Patient ID: Kareem Crain is a 53 y.o. male.    Hematuria      Patient is in the office today for a urinary problem.  Patient states on he started having blood in his urine on Wednesday.   Patient states that he has slight pain and stinging when he voids.  Since Saturday the sx have basically resolved with just a little stinging but not every time  Admits little water and mostly soda    Review of Systems   Genitourinary:  Positive for hematuria.     A comprehensive review of system was obtained and negative except findings in the HPI    /83 (Site: Right Upper Arm, Position: Sitting)   Pulse 79   Temp 98.3 °F (36.8 °C) (Oral)   Resp 18   Ht 1.829 m (6')   Wt 119.5 kg (263 lb 6.4 oz)   SpO2 98%   BMI 35.72 kg/m²   Objective:   Physical Exam  Vitals and nursing note reviewed.   Constitutional:       General: He is not in acute distress.     Appearance: Normal appearance.   Cardiovascular:      Rate and Rhythm: Normal rate and regular rhythm.   Pulmonary:      Effort: Pulmonary effort is normal. No respiratory distress.      Breath sounds: Normal breath sounds. No wheezing or rhonchi.   Musculoskeletal:         General: No swelling.   Neurological:      General: No focal deficit present.      Mental Status: He is alert and oriented to person, place, and time.   Psychiatric:         Mood and Affect: Mood normal.         Assessment / Plan:   Kareem was seen today for hematuria.    Diagnoses and all orders for this visit:    Gross hematuria  -     AMB POC URINALYSIS DIP STICK AUTO W/O MICRO  -     US PELVIS COMPLETE; Future      Urine dip is clean today  Will order US Renal and Pelvic to eval for stones  Dev plan with results  Needs to ramp up water intake and less soda    I have discussed the diagnosis with the patient and the intended plan as seen in the above orders. The patient has received an after-visit summary and questions were answered concerning future plans. Patient conveyed

## 2024-03-25 NOTE — PROGRESS NOTES
Chief Complaint   Patient presents with    Hematuria     Patient is in the office today for a urinary problem.  Patient states on he started having blood in his urine on Wednesday.   Patient states that he has slight pain and stinging when he voids.  No other concerns.     \"Have you been to the ER, urgent care clinic since your last visit?  Hospitalized since your last visit?\"    NO    “Have you seen or consulted any other health care providers outside of [facility] since your last visit?”    NO            Click Here for Release of Records Request

## 2024-04-10 ENCOUNTER — HOSPITAL ENCOUNTER (OUTPATIENT)
Facility: HOSPITAL | Age: 54
Discharge: HOME | End: 2024-04-13
Payer: COMMERCIAL

## 2024-04-10 DIAGNOSIS — R31.0 GROSS HEMATURIA: ICD-10-CM

## 2024-04-10 PROCEDURE — 76770 US EXAM ABDO BACK WALL COMP: CPT

## 2024-04-15 ENCOUNTER — CLINICAL DOCUMENTATION (OUTPATIENT)
Age: 54
End: 2024-04-15

## 2024-04-15 DIAGNOSIS — R59.0 ABDOMINAL LYMPHADENOPATHY: ICD-10-CM

## 2024-04-15 DIAGNOSIS — R59.0 LOCALIZED ENLARGED LYMPH NODES: Primary | ICD-10-CM

## 2024-05-01 ENCOUNTER — TELEPHONE (OUTPATIENT)
Age: 54
End: 2024-05-01

## 2024-05-01 NOTE — TELEPHONE ENCOUNTER
BS insurance verification Hilaria [phone] ext [phone] needs a peer to peer for pt's CT abdomen [address] . The # [phone] case # [id number]

## 2024-05-02 NOTE — TELEPHONE ENCOUNTER
Called and spoke the [employer] insurance company, they stated his CT of the abdomen for denied for medical necessity. Hilaria stated it needed a peer to peer review. Phone number [phone] case #[id number]

## 2024-05-08 ENCOUNTER — TELEPHONE (OUTPATIENT)
Age: 54
End: 2024-05-08

## 2024-05-08 DIAGNOSIS — R59.0 ABDOMINAL LYMPHADENOPATHY: Primary | ICD-10-CM

## 2024-05-08 NOTE — TELEPHONE ENCOUNTER
Patient in, wants to talk to you regarding his CT that was denied by insurance. Looks like the insurance wants a peer to peer.    [phone]

## 2024-05-09 ENCOUNTER — CLINICAL DOCUMENTATION (OUTPATIENT)
Age: 54
End: 2024-05-09

## 2024-05-09 NOTE — TELEPHONE ENCOUNTER
Peer to Peer done  Auth# [id number] valid 5/9-11/5/24  [facility] only  CT abd/pelvis with contrast only    Caitlyn Garcia    Patient aware

## 2024-05-16 DIAGNOSIS — I10 ESSENTIAL (PRIMARY) HYPERTENSION: ICD-10-CM

## 2024-05-17 RX ORDER — LISINOPRIL AND HYDROCHLOROTHIAZIDE 20; 12.5 MG/1; MG/1
1 TABLET ORAL DAILY
Qty: 90 TABLET | Refills: 0 | Status: SHIPPED | OUTPATIENT
Start: 2024-05-17

## 2024-07-03 ENCOUNTER — TELEPHONE (OUTPATIENT)
Age: 54
End: 2024-07-03

## 2024-07-03 NOTE — TELEPHONE ENCOUNTER
----- Message from Jose F Flanagan sent at 7/3/2024  3:27 PM EDT -----  Regarding: [facility] Appointment Request  [facility] Appointment Request    Patient needs appointment for [facility] Appointment Type: Existing Condition Follow Up.    Patient Requested Dates(s): Any time with Caitlyn  Patient Requested Time: Any time  Provider Name: Caitlyn Garcia    Reason for Appointment Request: Established Patient - No appointments available during search    Patient needs to get his CT scan results checked with Dr Brady specifically since she is the one who ordered the results. Patient said that his CT scan is not from [facility] but he already got the results that's why he wanted to check with Dr. Brady.  --------------------------------------------------------------------------------------------------------------------------    Relationship to Patient: Self     Call Back Information: OK to leave message on voicemail  Preferred Call Back Number: Phone [phone]               
Called and LVM  
Please find out if the report shows any abnormal findings and if he can bring a copy to the office. Also find out who ordered it. Caitlyn  
[place]

## 2024-07-30 ENCOUNTER — TELEPHONE (OUTPATIENT)
Age: 54
End: 2024-07-30

## 2024-07-30 ENCOUNTER — OFFICE VISIT (OUTPATIENT)
Age: 54
End: 2024-07-30
Payer: COMMERCIAL

## 2024-07-30 VITALS
HEART RATE: 70 BPM | OXYGEN SATURATION: 98 % | DIASTOLIC BLOOD PRESSURE: 99 MMHG | TEMPERATURE: 98.1 F | BODY MASS INDEX: 35.08 KG/M2 | HEIGHT: 72 IN | SYSTOLIC BLOOD PRESSURE: 154 MMHG | RESPIRATION RATE: 19 BRPM | WEIGHT: 259 LBS

## 2024-07-30 DIAGNOSIS — R59.0 ABDOMINAL LYMPHADENOPATHY: Primary | ICD-10-CM

## 2024-07-30 PROCEDURE — 99213 OFFICE O/P EST LOW 20 MIN: CPT | Performed by: NURSE PRACTITIONER

## 2024-07-30 PROCEDURE — 3077F SYST BP >= 140 MM HG: CPT | Performed by: NURSE PRACTITIONER

## 2024-07-30 PROCEDURE — 3080F DIAST BP >= 90 MM HG: CPT | Performed by: NURSE PRACTITIONER

## 2024-07-30 NOTE — TELEPHONE ENCOUNTER
Pt called to check to see if his referral/ medical records has been received. Pt stated that his PCP faxed the records today.  Advised the pt that I will send a message to the referral coordinator.     # [phone]

## 2024-07-30 NOTE — PROGRESS NOTES
Chief Complaint   Patient presents with    Other     Go over CT results.     Patient is in the office today to go over his CT results.  Patient stated that the CT was done May 31st.   No other concerns.    \"Have you been to the ER, urgent care clinic since your last visit?  Hospitalized since your last visit?\"    NO    “Have you seen or consulted any other health care providers outside of [facility] since your last visit?”    NO        “Have you had a colorectal cancer screening such as a colonoscopy/FIT/Cologuard?    NO    Date of last Colonoscopy: 4/7/2021  No cologuard on file  No FIT/FOBT on file   No flexible sigmoidoscopy on file         Click Here for Release of Records Request

## 2024-07-30 NOTE — PROGRESS NOTES
Kareem Crain (:  1970) is a 53 y.o. male,Established patient, here for evaluation of the following chief complaint(s):  Other (Go over CT results.)      Assessment & Plan   1. Abdominal lymphadenopathy  -     [facility] - Hematology/Oncology, [place]    A referral was made to [facility]  I contacted the office myself - He will be called today to have this appt set up today  Report scanned in chart for review in Media \"CT report\" today      No follow-ups on file.       Subjective   HPI  Patient presents for discussion of his CT abd done for lymphadenopathy seen on US  Report shows numerous enlarged lymph nodes with ? Primary tumor of the jejunum  He has no weight loss, sweats, abd pain or fatigue  Original pain was right flank  No pmh of cancer in the past and no fhx of CA    Review of Systems   A comprehensive review of system was obtained and negative except findings in the HPI    BP (!) 154/99 (Site: Right Upper Arm, Position: Sitting)   Pulse 70   Temp 98.1 °F (36.7 °C) (Oral)   Resp 19   Ht 1.829 m (6')   Wt 117.5 kg (259 lb)   SpO2 98%   BMI 35.13 kg/m²   Objective   Physical Exam  Vitals and nursing note reviewed.   Constitutional:       General: He is not in acute distress.     Appearance: Normal appearance.   Cardiovascular:      Rate and Rhythm: Normal rate and regular rhythm.   Pulmonary:      Effort: Pulmonary effort is normal. No respiratory distress.      Breath sounds: Normal breath sounds. No wheezing or rhonchi.   Musculoskeletal:         General: No swelling.   Lymphadenopathy:      Cervical: No cervical adenopathy.   Neurological:      General: No focal deficit present.      Mental Status: He is alert and oriented to person, place, and time.   Psychiatric:         Mood and Affect: Mood normal.            An electronic signature was used to authenticate this note.    --AKIKO Fan - AISLINN

## 2024-08-05 ENCOUNTER — CLINICAL DOCUMENTATION (OUTPATIENT)
Age: 54
End: 2024-08-05

## 2024-08-05 ENCOUNTER — TELEPHONE (OUTPATIENT)
Age: 54
End: 2024-08-05

## 2024-08-05 ENCOUNTER — INITIAL CONSULT (OUTPATIENT)
Age: 54
End: 2024-08-05
Payer: COMMERCIAL

## 2024-08-05 VITALS
DIASTOLIC BLOOD PRESSURE: 110 MMHG | HEART RATE: 91 BPM | OXYGEN SATURATION: 96 % | HEIGHT: 72 IN | SYSTOLIC BLOOD PRESSURE: 156 MMHG | WEIGHT: 258 LBS | RESPIRATION RATE: 18 BRPM | TEMPERATURE: 98 F | BODY MASS INDEX: 34.95 KG/M2

## 2024-08-05 DIAGNOSIS — K63.89 MESENTERIC MASS: Primary | ICD-10-CM

## 2024-08-05 DIAGNOSIS — R53.83 OTHER FATIGUE: ICD-10-CM

## 2024-08-05 DIAGNOSIS — K63.89 MESENTERIC MASS: ICD-10-CM

## 2024-08-05 PROCEDURE — 99204 OFFICE O/P NEW MOD 45 MIN: CPT | Performed by: INTERNAL MEDICINE

## 2024-08-05 PROCEDURE — 3080F DIAST BP >= 90 MM HG: CPT | Performed by: INTERNAL MEDICINE

## 2024-08-05 PROCEDURE — 3077F SYST BP >= 140 MM HG: CPT | Performed by: INTERNAL MEDICINE

## 2024-08-05 ASSESSMENT — PATIENT HEALTH QUESTIONNAIRE - PHQ9
SUM OF ALL RESPONSES TO PHQ9 QUESTIONS 1 & 2: 0
1. LITTLE INTEREST OR PLEASURE IN DOING THINGS: NOT AT ALL
SUM OF ALL RESPONSES TO PHQ QUESTIONS 1-9: 0
2. FEELING DOWN, DEPRESSED OR HOPELESS: NOT AT ALL
SUM OF ALL RESPONSES TO PHQ QUESTIONS 1-9: 0

## 2024-08-05 NOTE — PROGRESS NOTES
Chief Complaint   Patient presents with    New Patient           Vitals:    08/05/24 1441   BP: (!) 156/110   Pulse: 91   Resp: 18   Temp: 98 °F (36.7 °C)   SpO2: 96%            1. Have you been to the ER, urgent care clinic since your last visit?  Hospitalized since your last visit?  New Patient  2. Have you seen or consulted any other health care providers outside of the [facility] since your last visit?  Include any pap smears or colon screening. New Patient

## 2024-08-05 NOTE — PROGRESS NOTES
[facility] [place] at [facility] [place]  [address] [address], [address] [facility] [address]  W: [phone]  F: [phone] Patient ID  Name: Kareem Crain  YOB: 1970  MRN: [id number]  Referring Provider:   Caitlyn Garcia, APRN - NP  [id number] [address]  [place] [address]  Primary Care Provider:   Oliver Fountain MD       HEMATOLOGY/MEDICAL ONCOLOGY  NOTE     Reason for Evaluation:     Chief Complaint   Patient presents with    New Patient       Subjective:     History of Present Illness:   Date of Visit: 08/05/24    Kareem Crain is a 54 y.o. male who presents for an initial evaluation for a mesenteric mass. This has enlarged over months with incidental diagnosis on ultrasound ordered for right flank pain. Patient reports feeling well overall aside from some mild fatigue.     Patient reports adequate oral intake of food and hydration. Patient denies any bowel or bladder problems. Patient denies any uncontrolled pain. Patient denies any shortness of breath. Patient reports fatigue. Patient denies any gait disturbance.       Past Medical History:   Diagnosis Date    Controlled type 2 diabetes mellitus without complication, without long-term current use of insulin (HCC) 1/25/2022    HTN (hypertension) 8/20/2014      History reviewed. No pertinent surgical history.   Social History     Tobacco Use    Smoking status: Never    Smokeless tobacco: Never   Substance Use Topics    Alcohol use: Yes      Family History   Problem Relation Age of Onset    Hypertension Mother      Current Outpatient Medications   Medication Sig    lisinopril-hydroCHLOROthiazide (PRINZIDE;ZESTORETIC) 20-12.5 MG per tablet TAKE 1 TABLET BY MOUTH EVERY DAY     No current facility-administered medications for this visit.      No Known Allergies   -  Review of Systems provided by:patient  General: denies fever, denies chills, denies night sweats, and reports fatigue  Eyes: denies any acute vision

## 2024-08-06 ENCOUNTER — TELEPHONE (OUTPATIENT)
Age: 54
End: 2024-08-06

## 2024-08-06 LAB
ALBUMIN SERPL-MCNC: 4.6 G/DL (ref 3.8–4.9)
ALP SERPL-CCNC: 125 IU/L (ref 44–121)
ALT SERPL-CCNC: 31 IU/L (ref 0–44)
AST SERPL-CCNC: 28 IU/L (ref 0–40)
BASOPHILS # BLD AUTO: 0.1 X10E3/UL (ref 0–0.2)
BASOPHILS NFR BLD AUTO: 1 %
BILIRUB SERPL-MCNC: 0.7 MG/DL (ref 0–1.2)
BUN SERPL-MCNC: 14 MG/DL (ref 6–24)
BUN/CREAT SERPL: 11 (ref 9–20)
CALCIUM SERPL-MCNC: 9.7 MG/DL (ref 8.7–10.2)
CHLORIDE SERPL-SCNC: 102 MMOL/L (ref 96–106)
CO2 SERPL-SCNC: 26 MMOL/L (ref 20–29)
CREAT SERPL-MCNC: 1.26 MG/DL (ref 0.76–1.27)
EGFRCR SERPLBLD CKD-EPI 2021: 67 ML/MIN/1.73
EOSINOPHIL # BLD AUTO: 0.1 X10E3/UL (ref 0–0.4)
EOSINOPHIL NFR BLD AUTO: 1 %
ERYTHROCYTE [DISTWIDTH] IN BLOOD BY AUTOMATED COUNT: 12.7 % (ref 11.6–15.4)
GLOBULIN SER CALC-MCNC: 2.4 G/DL (ref 1.5–4.5)
GLUCOSE SERPL-MCNC: 114 MG/DL (ref 70–99)
HCT VFR BLD AUTO: 45.9 % (ref 37.5–51)
HGB BLD-MCNC: 14.7 G/DL (ref 13–17.7)
IMM GRANULOCYTES # BLD AUTO: 0 X10E3/UL (ref 0–0.1)
IMM GRANULOCYTES NFR BLD AUTO: 0 %
LYMPHOCYTES # BLD AUTO: 2 X10E3/UL (ref 0.7–3.1)
LYMPHOCYTES NFR BLD AUTO: 32 %
MCH RBC QN AUTO: 27.4 PG (ref 26.6–33)
MCHC RBC AUTO-ENTMCNC: 32 G/DL (ref 31.5–35.7)
MCV RBC AUTO: 86 FL (ref 79–97)
MONOCYTES # BLD AUTO: 0.7 X10E3/UL (ref 0.1–0.9)
MONOCYTES NFR BLD AUTO: 11 %
NEUTROPHILS # BLD AUTO: 3.4 X10E3/UL (ref 1.4–7)
NEUTROPHILS NFR BLD AUTO: 55 %
PLATELET # BLD AUTO: 258 X10E3/UL (ref 150–450)
POTASSIUM SERPL-SCNC: 3.8 MMOL/L (ref 3.5–5.2)
PROT SERPL-MCNC: 7 G/DL (ref 6–8.5)
RBC # BLD AUTO: 5.37 X10E6/UL (ref 4.14–5.8)
SODIUM SERPL-SCNC: 142 MMOL/L (ref 134–144)
WBC # BLD AUTO: 6.3 X10E3/UL (ref 3.4–10.8)

## 2024-08-06 NOTE — TELEPHONE ENCOUNTER
[facility] Social Work   Psychosocial Assessment    [] Med-Onc [facility] [x] Med-Onc [facility] [] Med-Onc [facility] [] Rad-Onc [facility] [] Rad-Onc [facility] [] Rad-Onc [facility] [] Rad-Onc [facility] [] Breast Center       Patient: Kareem Crain    Reason for Assessment:    [] Social Work Referral  [x] Initial Assessment  [] New Diagnosis  [] Other:     Advance Care Planning:  [] AMD Discussed and Completed  [] AMD Reviewed Verbally [] AMD Copy Provided  [] Conversation Deferred [] Conversation Declined      Sources of Information:    [x] Patient  [] Family  [] Staff  [x] Medical Record    Mental Status:    [x] Alert  [] Lethargic  [] Unresponsive  [] Unable to assess   Oriented to: [x] Person  [x] Place  [x] Time  [x] Situation      Relationship Status:  [] Single  [x]   [] Significant Other/Life Partner  []   []   []     Living Circumstances:  [] Lives Alone  [x] Family/Significant Other in Household  [] Roommates  [] Children in the Home  [] Paid Caregivers  [] Assisted Living Facility/Group Home  [] Skilled Nursing Facility  [] Homeless  [] Incarcerated  [] Environmental/Care Concerns  [] Other:    Employment Status:   [x] Employed Full-time  [] Employed Part-time  [] Homemaker  [] Retired  [] Short-Term Disability  [] Long-Term Disability  [] Unemployed  [] SSI  [] SSDI  [] Self-Employment    Transportation Resources:   [x] Self  [x] Family/Friends  [] Insurance  [] Community Programs  [] Other:    Barriers to Learning:    [] Language  [] Developmental  [] Cognitive  [] Altered Mental Status  [] Visual/Hearing Impairment  [] Unable to Read/Write  [] Motivational  [] Challenges Understanding Medical Jargon [x] No Barriers Identified      Financial/Legal Concerns:    [] Uninsured  [] Limited Income/Resources  [] Non-Citizen  [] Food Insecurity [x] No Concerns Identified   [] Other:    Voodoo/Spiritual/Existential:   Does patient have any spiritual or Samaritan beliefs? [] Yes [] No  Is the patient involved

## 2024-08-06 NOTE — PROGRESS NOTES
NCCN Distress Thermometer    Date Screening Completed: 8/5/24    Screening Declined:  [] Yes    Number that best describes how much distress you've experienced in the past week, including today?  0 [] - No distress 1 []      2 []      3 []      4 []       5 [x]       6 []      7 []      8 []      9 []       10 [] - Extreme distress    PROBLEM LIST  Have you had concerns about any of the items below in the past week, including today?      Physical Concerns Practical Concerns   [] Pain [] Taking care of myself    [] Sleep [] Taking care of others    [x] Fatigue [] Work   [] Tobacco use  [] School   [] Substance use  [] Housing   [] Memory or concentration [] Finances   [] Sexual health [] Insurance   [] Changes in eating  [] Transportation   [] Loss or change of physical abilities  []     [] Having enough food   Emotional Concerns [] Access to medicine   [x] Worry or anxiety [] Treatment decisions   [] Sadness or depression    [] Loss of interest or enjoyment  Spiritual or Holiness Concerns   [] Grief or loss  [] Sense of meaning or purpose   [] Fear [] Changes in mary or beliefs   [] Loneliness  [] Death, dying, or afterlife   [] Anger [] Conflict between beliefs and cancer treatments    [] Changes in appearance [] Relationship with the sacred   [] Feelings of worthlessness or being a burden [] Ritual or dietary needs        Social Concerns     [] Relationship with spouse or partner     [] Relationship with children    [] Relationship with family members     [] Relationship with friends or coworkers     [] Communication with health care team     [] Ability to have children     [] Prejudice or discrimination        Other Concerns:     Patient received resource information and education:  [] Yes  [] No

## 2024-08-06 NOTE — TELEPHONE ENCOUNTER
Called pt to inform of upcoming appt info    [facility]    Dr Cornelio Collins  8/22/24 - 2:00 PM - Arrive 15-20 Minutes early    [address], [address], [facility] [address] [phone]

## 2024-08-07 LAB
CEA SERPL-MCNC: 1.6 NG/ML (ref 0–4.7)
CGA SERPL-MCNC: 27.5 NG/ML (ref 0–101.8)
LDH SERPL L TO P-CCNC: 196 IU/L (ref 121–224)

## 2024-08-08 ENCOUNTER — TELEPHONE (OUTPATIENT)
Age: 54
End: 2024-08-08

## 2024-08-08 LAB
ANALYSIS AND GATING STRATEGY: NORMAL
ASSESSMENT OF LEUKOCYTES: NORMAL
CLINICAL INFO: NORMAL
IMMUNOPHENOTYPING STUDY: NORMAL
LABORATORY COMMENT REPORT: NORMAL
PATH INTERP SPEC-IMP: NORMAL
PATHOLOGIST NAME: NORMAL
SPECIMEN SOURCE: NORMAL
VIABLE CELLS NFR SPEC: NORMAL %

## 2024-08-08 NOTE — TELEPHONE ENCOUNTER
Patient called and stated that he has a CT scheduled for today and he was called and told that his insurance didn't authorize it being done. Patient stated that his insurance did authorize it, just at [facility]. Pt requested that the order be sent and he be notified once it has been faxed.     [facility] Fx# [phone]  CB# [phone]

## 2024-08-08 NOTE — TELEPHONE ENCOUNTER
[facility]  [phone]    08/08/24 9:07 AM EDT - Called and spoke with the patient.  Patient's ID verified x 2.  The patient states the CT department had called him to let him know the CT was approved but pending site of service.  Advised this nurse faxed the CT scan order to [facility].  Encouraged him to call and set up an appointment.  Asked that once an appointment is made, he call this nurse back to provide the appointment information.  Advised this nurse will then call his insurance to ensure the authorization is approved.  The patient voiced understanding and gratitude for the call.  No further questions or concerns.

## 2024-08-19 DIAGNOSIS — I10 ESSENTIAL (PRIMARY) HYPERTENSION: ICD-10-CM

## 2024-08-19 RX ORDER — LISINOPRIL AND HYDROCHLOROTHIAZIDE 20; 12.5 MG/1; MG/1
TABLET ORAL
Qty: 90 TABLET | Refills: 0 | Status: SHIPPED | OUTPATIENT
Start: 2024-08-19

## 2024-08-22 ENCOUNTER — OFFICE VISIT (OUTPATIENT)
Age: 54
End: 2024-08-22
Payer: COMMERCIAL

## 2024-08-22 VITALS
DIASTOLIC BLOOD PRESSURE: 83 MMHG | HEIGHT: 72 IN | SYSTOLIC BLOOD PRESSURE: 139 MMHG | WEIGHT: 254.8 LBS | RESPIRATION RATE: 14 BRPM | BODY MASS INDEX: 34.51 KG/M2 | HEART RATE: 86 BPM | TEMPERATURE: 98.3 F | OXYGEN SATURATION: 97 %

## 2024-08-22 DIAGNOSIS — K63.89 MESENTERIC MASS: Primary | ICD-10-CM

## 2024-08-22 PROCEDURE — 99205 OFFICE O/P NEW HI 60 MIN: CPT | Performed by: SURGERY

## 2024-08-22 PROCEDURE — 3079F DIAST BP 80-89 MM HG: CPT | Performed by: SURGERY

## 2024-08-22 PROCEDURE — 3075F SYST BP GE 130 - 139MM HG: CPT | Performed by: SURGERY

## 2024-08-22 ASSESSMENT — PATIENT HEALTH QUESTIONNAIRE - PHQ9
SUM OF ALL RESPONSES TO PHQ QUESTIONS 1-9: 0
1. LITTLE INTEREST OR PLEASURE IN DOING THINGS: NOT AT ALL
SUM OF ALL RESPONSES TO PHQ9 QUESTIONS 1 & 2: 0
2. FEELING DOWN, DEPRESSED OR HOPELESS: NOT AT ALL
SUM OF ALL RESPONSES TO PHQ QUESTIONS 1-9: 0

## 2024-08-22 NOTE — PROGRESS NOTES
Identified patient with two patient identifiers (name and ). Reviewed chart in preparation for visit and have obtained necessary documentation.    Kareem Crain is a 54 y.o. male  Chief Complaint   Patient presents with    New Patient     Mesenteric mass     /83 (Site: Left Upper Arm, Position: Sitting, Cuff Size: Large Adult)   Pulse 86   Temp 98.3 °F (36.8 °C) (Oral)   Resp 14   Ht 1.829 m (6')   Wt 115.6 kg (254 lb 12.8 oz)   SpO2 97%   BMI 34.56 kg/m²     1. Have you been to the ER, urgent care clinic since your last visit?  Hospitalized since your last visit?no    2. Have you seen or consulted any other health care providers outside of the [facility] since your last visit?  Include any pap smears or colon screening. no

## 2024-08-22 NOTE — PROGRESS NOTES
[facility] History and Physical    Chief Complaint: Mesenteric mass    History of Present Illness:      Kareem Crain is a 54 y.o. male who was kindly referred by Dr. Cornelio Santacruz for evaluation of a mesenteric mass versus mesenteric adenopathy.  The patient was complaining of some intermittent right flank pain and traces of blood in his urine.  This led to an ultrasound which showed concern for a mass within the small bowel mesentery.  He next underwent a CT scan which showed similar findings.  The patient was seen by Dr. Santacruz and had lab work obtained including chromogranin A and CEA.  These were both unremarkable at 27.5 and 1.6 respectively.  The patient denies any fevers, weight loss, abdominal pain or other symptoms.  He is in his usual state of health and his right flank pains have resolved.  He is subsequently referred for consideration of surgical biopsy of the mesenteric lymphadenopathy.    Past Medical History:   Diagnosis Date    Controlled type 2 diabetes mellitus without complication, without long-term current use of insulin (HCC) 1/25/2022    HTN (hypertension) 8/20/2014       No past surgical history on file.    Social History     Socioeconomic History    Marital status: Single     Spouse name: Not on file    Number of children: Not on file    Years of education: Not on file    Highest education level: Not on file   Occupational History    Not on file   Tobacco Use    Smoking status: Never    Smokeless tobacco: Never   Vaping Use    Vaping status: Never Used   Substance and Sexual Activity    Alcohol use: Yes    Drug use: No    Sexual activity: Not on file   Other Topics Concern    Not on file   Social History Narrative    Denies any ocupational exposoures such as inhalation or splashes.     Social Determinants of Health     Financial Resource Strain: Low Risk  (3/25/2024)    Overall Financial Resource Strain (CARDIA)     Difficulty of Paying Living Expenses: Not hard at all

## 2024-08-23 ENCOUNTER — TELEPHONE (OUTPATIENT)
Age: 54
End: 2024-08-23

## 2024-08-23 ENCOUNTER — PREP FOR PROCEDURE (OUTPATIENT)
Age: 54
End: 2024-08-23

## 2024-08-23 DIAGNOSIS — I88.0 NONSPECIFIC MESENTERIC LYMPHADENITIS: ICD-10-CM

## 2024-08-23 RX ORDER — SODIUM CHLORIDE 0.9 % (FLUSH) 0.9 %
5-40 SYRINGE (ML) INJECTION EVERY 12 HOURS SCHEDULED
Status: CANCELLED | OUTPATIENT
Start: 2024-08-23

## 2024-08-23 RX ORDER — SODIUM CHLORIDE 9 MG/ML
INJECTION, SOLUTION INTRAVENOUS PRN
Status: CANCELLED | OUTPATIENT
Start: 2024-08-23

## 2024-08-23 RX ORDER — SODIUM CHLORIDE 0.9 % (FLUSH) 0.9 %
5-40 SYRINGE (ML) INJECTION PRN
Status: CANCELLED | OUTPATIENT
Start: 2024-08-23

## 2024-08-23 NOTE — TELEPHONE ENCOUNTER
Contacted patient regarding scheduling surgery with Dr. Collins. Offered the date of 08/30 and patient accepted. Informed patient that PAT will be reaching out as well as a surgical letter will be sent out, patient understood.

## 2024-08-27 ENCOUNTER — CLINICAL DOCUMENTATION (OUTPATIENT)
Age: 54
End: 2024-08-27

## 2024-08-29 ENCOUNTER — OFFICE VISIT (OUTPATIENT)
Age: 54
End: 2024-08-29
Payer: COMMERCIAL

## 2024-08-29 VITALS
HEART RATE: 99 BPM | SYSTOLIC BLOOD PRESSURE: 129 MMHG | TEMPERATURE: 97.3 F | WEIGHT: 255 LBS | HEIGHT: 73 IN | DIASTOLIC BLOOD PRESSURE: 85 MMHG | RESPIRATION RATE: 14 BRPM | BODY MASS INDEX: 33.8 KG/M2 | OXYGEN SATURATION: 95 %

## 2024-08-29 DIAGNOSIS — Z87.898 HISTORY OF FATIGUE: ICD-10-CM

## 2024-08-29 DIAGNOSIS — K63.89 MESENTERIC MASS: Primary | ICD-10-CM

## 2024-08-29 PROCEDURE — 3074F SYST BP LT 130 MM HG: CPT | Performed by: INTERNAL MEDICINE

## 2024-08-29 PROCEDURE — 99214 OFFICE O/P EST MOD 30 MIN: CPT | Performed by: INTERNAL MEDICINE

## 2024-08-29 PROCEDURE — 3079F DIAST BP 80-89 MM HG: CPT | Performed by: INTERNAL MEDICINE

## 2024-08-29 NOTE — PROGRESS NOTES
Identified pt with two pt identifiers(name and ). Reviewed record in preparation for visit and have obtained necessary documentation. All patient medications has been reviewed.  Chief Complaint   Patient presents with    Follow-up     3 week       Vitals:    24 1420   BP: 129/85   Pulse: 99   Resp: 14   Temp: 97.3 °F (36.3 °C)   SpO2: 95%                   Coordination of Care Questionnaire:   1) Have you been to an emergency room, urgent care, or hospitalized since your last visit?   no      2. Have seen or consulted any other health care provider since your last visit? no

## 2024-08-29 NOTE — PROGRESS NOTES
[facility] [place] at [facility]  [address], [address] [facility] [address]  W: [phone]  F: [phone] Patient ID  Name: Kareem Crain  YOB: 1970  MRN: [id number]  Referring Provider:   No referring provider defined for this encounter.  Primary Care Provider:   Oliver Fountain MD       HEMATOLOGY/MEDICAL ONCOLOGY  NOTE     Reason for Evaluation:     Chief Complaint   Patient presents with    Follow-up     3 week       Subjective:     History of Present Illness:   Date of Visit: 08/29/24    Kareem Crain is a 54 y.o. male who presents for a follow-up evaluation for mesenteric mass. He saw Dr. Collins and they have reportedly scheduled a open surgical tissue biopsy.             Patient overall reports feeling stable. Denies any fatigue at this visit. Reports feeling well.       Past Medical History:   Diagnosis Date    Controlled type 2 diabetes mellitus without complication, without long-term current use of insulin (HCC) 1/25/2022    HTN (hypertension) 8/20/2014      History reviewed. No pertinent surgical history.   Social History     Tobacco Use    Smoking status: Never    Smokeless tobacco: Never   Substance Use Topics    Alcohol use: Yes      Family History   Problem Relation Age of Onset    Hypertension Mother     Cerebral Aneurysm Father 62    Hypertension Father     Cerebral Aneurysm Paternal Aunt 50    Cerebral Aneurysm Paternal Aunt 60    Cancer Neg Hx      Current Outpatient Medications   Medication Sig    lisinopril-hydroCHLOROthiazide (PRINZIDE;ZESTORETIC) 20-12.5 MG per tablet TAKE 1 TABLET BY MOUTH EVERY DAY **NEED FOLLOW UP APPOINTMENT FOR REFILLS     No current facility-administered medications for this visit.      No Known Allergies   -  Review of Systems Provided by:  Patient  Review of Systems: A complete review of systems was obtained, reviewed.  Pertinent findings reviewed above.  Past medical history, social history, and family history  are

## 2024-08-30 ENCOUNTER — HOSPITAL ENCOUNTER (OUTPATIENT)
Facility: HOSPITAL | Age: 54
Discharge: HOME | End: 2024-09-02
Payer: COMMERCIAL

## 2024-08-30 VITALS
HEIGHT: 72 IN | BODY MASS INDEX: 34.54 KG/M2 | HEART RATE: 75 BPM | RESPIRATION RATE: 18 BRPM | DIASTOLIC BLOOD PRESSURE: 85 MMHG | SYSTOLIC BLOOD PRESSURE: 127 MMHG | WEIGHT: 255 LBS | OXYGEN SATURATION: 95 % | TEMPERATURE: 98.1 F

## 2024-08-30 LAB
EKG ATRIAL RATE: 70 BPM
EKG DIAGNOSIS: NORMAL
EKG P AXIS: 22 DEGREES
EKG P-R INTERVAL: 176 MS
EKG Q-T INTERVAL: 384 MS
EKG QRS DURATION: 88 MS
EKG QTC CALCULATION (BAZETT): 414 MS
EKG R AXIS: 11 DEGREES
EKG T AXIS: 17 DEGREES
EKG VENTRICULAR RATE: 70 BPM

## 2024-08-30 PROCEDURE — 93005 ELECTROCARDIOGRAM TRACING: CPT | Performed by: SURGERY

## 2024-08-30 PROCEDURE — 93010 ELECTROCARDIOGRAM REPORT: CPT | Performed by: SPECIALIST

## 2024-08-30 NOTE — PERIOP NOTE
[address] [address]   MAIN OR                                  [phone]    MAIN PRE OP             [phone]                                                                                AMBULATORY PRE OP          [phone]  PRE-ADMISSION TESTING    [phone]     Surgery Date:  09-       Is surgery arrival time given by surgeon?  YES      If “NO”, [facility] staff will call you between 4 and 7pm the day before your surgery with your arrival time. (If your surgery is on a Monday, we will call you the Friday before.)    Call [phone] after 7pm Monday-Friday if you did not receive this call.    INSTRUCTIONS BEFORE YOUR SURGERY   When You  Arrive Arrive at [facility] Patient Access on 1st floor the day of your surgery.  Have your insurance card, photo ID,living will/advanced directive/POA (if applicable),  and any copayment (if needed)   Food   and   Drink NO solid food after midnight the night before surgery. You can drink clear liquids from midnight until ONE hour prior to your arrival at the hospital on the day of your surgery. Clear liquids include:  Water  Fruit juices without pulp  Carbonated beverages  Black coffee(no cream/milk)  Tea(no cream/milk)  Gatorade    No alcohol (beer, wine, liquor) or marijuana (smoking) 24 hours, edibles (3 days). Stop smoking cigarettes 14 days before surgery (helps w/healing and breathing).   Medications to   TAKE   Morning of Surgery MEDICATIONS TO TAKE THE MORNING OF SURGERY WITH A SIP OF WATER:None     You may take these medications, IF NEEDED, the morning of surgery:   Ask your surgeon/prescribing doctor for instructions on taking or stopping these medications prior to surgery:    Medications to STOP  before surgery Non-Steroidal anti-inflammatory Drugs (NSAID's): for example, Ibuprofen (Advil, Motrin), Naproxen (Aleve) 7 days  STOP all herbal supplements and vitamins(unless

## 2024-08-30 NOTE — PERIOP NOTE
Pre op and medication instructions printed and reviewed with patient. Two bottles of chg soap given. Surgical site infection sheet given. Opportunity for questions given and all questions were answered.     Consent form signed and on chart for scheduled surgey on September 6, 2024 @ [facility] with Dr.Michael Collins

## 2024-09-03 PROBLEM — Z87.898 HISTORY OF FATIGUE: Status: ACTIVE | Noted: 2024-09-03

## 2024-09-05 ENCOUNTER — TELEPHONE (OUTPATIENT)
Age: 54
End: 2024-09-05

## 2024-09-05 RX ORDER — SODIUM CHLORIDE 0.9 % (FLUSH) 0.9 %
5-40 SYRINGE (ML) INJECTION EVERY 12 HOURS SCHEDULED
Status: CANCELLED | OUTPATIENT
Start: 2024-09-05

## 2024-09-05 RX ORDER — DIPHENHYDRAMINE HYDROCHLORIDE 50 MG/ML
12.5 INJECTION INTRAMUSCULAR; INTRAVENOUS
Status: CANCELLED | OUTPATIENT
Start: 2024-09-05 | End: 2024-09-06

## 2024-09-05 RX ORDER — OXYCODONE HYDROCHLORIDE 5 MG/1
5 TABLET ORAL
Status: CANCELLED | OUTPATIENT
Start: 2024-09-05 | End: 2024-09-06

## 2024-09-05 RX ORDER — PROCHLORPERAZINE EDISYLATE 5 MG/ML
5 INJECTION INTRAMUSCULAR; INTRAVENOUS
Status: CANCELLED | OUTPATIENT
Start: 2024-09-05 | End: 2024-09-06

## 2024-09-05 RX ORDER — ONDANSETRON 2 MG/ML
4 INJECTION INTRAMUSCULAR; INTRAVENOUS
Status: CANCELLED | OUTPATIENT
Start: 2024-09-05 | End: 2024-09-06

## 2024-09-05 RX ORDER — LORAZEPAM 2 MG/ML
0.5 INJECTION INTRAMUSCULAR
Status: CANCELLED | OUTPATIENT
Start: 2024-09-05 | End: 2024-09-06

## 2024-09-05 RX ORDER — SODIUM CHLORIDE 0.9 % (FLUSH) 0.9 %
5-40 SYRINGE (ML) INJECTION PRN
Status: CANCELLED | OUTPATIENT
Start: 2024-09-05

## 2024-09-05 RX ORDER — HYDRALAZINE HYDROCHLORIDE 20 MG/ML
10 INJECTION INTRAMUSCULAR; INTRAVENOUS
Status: CANCELLED | OUTPATIENT
Start: 2024-09-05

## 2024-09-05 RX ORDER — IPRATROPIUM BROMIDE AND ALBUTEROL SULFATE 2.5; .5 MG/3ML; MG/3ML
1 SOLUTION RESPIRATORY (INHALATION)
Status: CANCELLED | OUTPATIENT
Start: 2024-09-05 | End: 2024-09-06

## 2024-09-05 RX ORDER — FENTANYL CITRATE 50 UG/ML
25 INJECTION, SOLUTION INTRAMUSCULAR; INTRAVENOUS EVERY 5 MIN PRN
Status: CANCELLED | OUTPATIENT
Start: 2024-09-05

## 2024-09-05 RX ORDER — SODIUM CHLORIDE 9 MG/ML
INJECTION, SOLUTION INTRAVENOUS PRN
Status: CANCELLED | OUTPATIENT
Start: 2024-09-05

## 2024-09-05 RX ORDER — NALOXONE HYDROCHLORIDE 0.4 MG/ML
INJECTION, SOLUTION INTRAMUSCULAR; INTRAVENOUS; SUBCUTANEOUS PRN
Status: CANCELLED | OUTPATIENT
Start: 2024-09-05

## 2024-09-06 ENCOUNTER — ANESTHESIA (OUTPATIENT)
Facility: HOSPITAL | Age: 54
End: 2024-09-06
Payer: COMMERCIAL

## 2024-09-06 ENCOUNTER — HOSPITAL ENCOUNTER (OUTPATIENT)
Facility: HOSPITAL | Age: 54
Setting detail: OUTPATIENT SURGERY
Discharge: HOME | End: 2024-09-06
Attending: SURGERY | Admitting: SURGERY
Payer: COMMERCIAL

## 2024-09-06 ENCOUNTER — ANESTHESIA EVENT (OUTPATIENT)
Facility: HOSPITAL | Age: 54
End: 2024-09-06
Payer: COMMERCIAL

## 2024-09-06 VITALS
HEART RATE: 69 BPM | TEMPERATURE: 98.2 F | DIASTOLIC BLOOD PRESSURE: 65 MMHG | RESPIRATION RATE: 20 BRPM | SYSTOLIC BLOOD PRESSURE: 124 MMHG | OXYGEN SATURATION: 100 %

## 2024-09-06 DIAGNOSIS — R59.0 MESENTERIC LYMPHADENOPATHY: Primary | ICD-10-CM

## 2024-09-06 LAB
GLUCOSE BLD STRIP.AUTO-MCNC: 112 MG/DL (ref 65–117)
SERVICE CMNT-IMP: NORMAL

## 2024-09-06 PROCEDURE — 3600000004 HC SURGERY LEVEL 4 BASE: Performed by: SURGERY

## 2024-09-06 PROCEDURE — 7100000011 HC PHASE II RECOVERY - ADDTL 15 MIN: Performed by: SURGERY

## 2024-09-06 PROCEDURE — 3700000001 HC ADD 15 MINUTES (ANESTHESIA): Performed by: SURGERY

## 2024-09-06 PROCEDURE — 82962 GLUCOSE BLOOD TEST: CPT

## 2024-09-06 PROCEDURE — 2580000003 HC RX 258: Performed by: ANESTHESIOLOGY

## 2024-09-06 PROCEDURE — 49321 LAPAROSCOPY BIOPSY: CPT | Performed by: PHYSICIAN ASSISTANT

## 2024-09-06 PROCEDURE — 2500000003 HC RX 250 WO HCPCS: Performed by: SURGERY

## 2024-09-06 PROCEDURE — 3600000014 HC SURGERY LEVEL 4 ADDTL 15MIN: Performed by: SURGERY

## 2024-09-06 PROCEDURE — 2580000003 HC RX 258

## 2024-09-06 PROCEDURE — 88342 IMHCHEM/IMCYTCHM 1ST ANTB: CPT

## 2024-09-06 PROCEDURE — 88185 FLOWCYTOMETRY/TC ADD-ON: CPT

## 2024-09-06 PROCEDURE — 6360000002 HC RX W HCPCS

## 2024-09-06 PROCEDURE — 7100000001 HC PACU RECOVERY - ADDTL 15 MIN: Performed by: SURGERY

## 2024-09-06 PROCEDURE — 88365 INSITU HYBRIDIZATION (FISH): CPT

## 2024-09-06 PROCEDURE — 88331 PATH CONSLTJ SURG 1 BLK 1SPC: CPT

## 2024-09-06 PROCEDURE — 49321 LAPAROSCOPY BIOPSY: CPT | Performed by: SURGERY

## 2024-09-06 PROCEDURE — 7100000000 HC PACU RECOVERY - FIRST 15 MIN: Performed by: SURGERY

## 2024-09-06 PROCEDURE — 6360000002 HC RX W HCPCS: Performed by: SURGERY

## 2024-09-06 PROCEDURE — 6370000000 HC RX 637 (ALT 250 FOR IP): Performed by: ANESTHESIOLOGY

## 2024-09-06 PROCEDURE — 2709999900 HC NON-CHARGEABLE SUPPLY: Performed by: SURGERY

## 2024-09-06 PROCEDURE — 88305 TISSUE EXAM BY PATHOLOGIST: CPT

## 2024-09-06 PROCEDURE — 88341 IMHCHEM/IMCYTCHM EA ADD ANTB: CPT

## 2024-09-06 PROCEDURE — 3700000000 HC ANESTHESIA ATTENDED CARE: Performed by: SURGERY

## 2024-09-06 PROCEDURE — 2580000003 HC RX 258: Performed by: SURGERY

## 2024-09-06 PROCEDURE — 2500000003 HC RX 250 WO HCPCS

## 2024-09-06 PROCEDURE — 88184 FLOWCYTOMETRY/ TC 1 MARKER: CPT

## 2024-09-06 PROCEDURE — 7100000010 HC PHASE II RECOVERY - FIRST 15 MIN: Performed by: SURGERY

## 2024-09-06 RX ORDER — NEOSTIGMINE METHYLSULFATE 1 MG/ML
INJECTION, SOLUTION INTRAVENOUS PRN
Status: DISCONTINUED | OUTPATIENT
Start: 2024-09-06 | End: 2024-09-06 | Stop reason: SDUPTHER

## 2024-09-06 RX ORDER — BUPIVACAINE HYDROCHLORIDE AND EPINEPHRINE 2.5; 5 MG/ML; UG/ML
INJECTION, SOLUTION EPIDURAL; INFILTRATION; INTRACAUDAL; PERINEURAL PRN
Status: DISCONTINUED | OUTPATIENT
Start: 2024-09-06 | End: 2024-09-06 | Stop reason: HOSPADM

## 2024-09-06 RX ORDER — SUCCINYLCHOLINE/SOD CL,ISO/PF 200MG/10ML
SYRINGE (ML) INTRAVENOUS PRN
Status: DISCONTINUED | OUTPATIENT
Start: 2024-09-06 | End: 2024-09-06 | Stop reason: SDUPTHER

## 2024-09-06 RX ORDER — FENTANYL CITRATE 50 UG/ML
100 INJECTION, SOLUTION INTRAMUSCULAR; INTRAVENOUS
Status: DISCONTINUED | OUTPATIENT
Start: 2024-09-06 | End: 2024-09-06 | Stop reason: HOSPADM

## 2024-09-06 RX ORDER — GLYCOPYRROLATE 0.2 MG/ML
INJECTION INTRAMUSCULAR; INTRAVENOUS PRN
Status: DISCONTINUED | OUTPATIENT
Start: 2024-09-06 | End: 2024-09-06 | Stop reason: SDUPTHER

## 2024-09-06 RX ORDER — SODIUM CHLORIDE 0.9 % (FLUSH) 0.9 %
5-40 SYRINGE (ML) INJECTION PRN
Status: DISCONTINUED | OUTPATIENT
Start: 2024-09-06 | End: 2024-09-06 | Stop reason: HOSPADM

## 2024-09-06 RX ORDER — FENTANYL CITRATE 50 UG/ML
INJECTION, SOLUTION INTRAMUSCULAR; INTRAVENOUS PRN
Status: DISCONTINUED | OUTPATIENT
Start: 2024-09-06 | End: 2024-09-06 | Stop reason: SDUPTHER

## 2024-09-06 RX ORDER — DOCUSATE SODIUM 100 MG/1
100 CAPSULE, LIQUID FILLED ORAL 2 TIMES DAILY
Qty: 20 CAPSULE | Refills: 0 | Status: SHIPPED | OUTPATIENT
Start: 2024-09-06 | End: 2024-09-16

## 2024-09-06 RX ORDER — DEXAMETHASONE SODIUM PHOSPHATE 4 MG/ML
INJECTION, SOLUTION INTRA-ARTICULAR; INTRALESIONAL; INTRAMUSCULAR; INTRAVENOUS; SOFT TISSUE PRN
Status: DISCONTINUED | OUTPATIENT
Start: 2024-09-06 | End: 2024-09-06 | Stop reason: SDUPTHER

## 2024-09-06 RX ORDER — MIDAZOLAM HYDROCHLORIDE 1 MG/ML
INJECTION INTRAMUSCULAR; INTRAVENOUS PRN
Status: DISCONTINUED | OUTPATIENT
Start: 2024-09-06 | End: 2024-09-06 | Stop reason: SDUPTHER

## 2024-09-06 RX ORDER — PHENYLEPHRINE HCL IN 0.9% NACL 0.4MG/10ML
SYRINGE (ML) INTRAVENOUS PRN
Status: DISCONTINUED | OUTPATIENT
Start: 2024-09-06 | End: 2024-09-06 | Stop reason: SDUPTHER

## 2024-09-06 RX ORDER — LIDOCAINE HYDROCHLORIDE 20 MG/ML
INJECTION, SOLUTION EPIDURAL; INFILTRATION; INTRACAUDAL; PERINEURAL PRN
Status: DISCONTINUED | OUTPATIENT
Start: 2024-09-06 | End: 2024-09-06 | Stop reason: SDUPTHER

## 2024-09-06 RX ORDER — DEXMEDETOMIDINE HYDROCHLORIDE 100 UG/ML
INJECTION, SOLUTION INTRAVENOUS PRN
Status: DISCONTINUED | OUTPATIENT
Start: 2024-09-06 | End: 2024-09-06 | Stop reason: SDUPTHER

## 2024-09-06 RX ORDER — LIDOCAINE HYDROCHLORIDE 10 MG/ML
1 INJECTION, SOLUTION EPIDURAL; INFILTRATION; INTRACAUDAL; PERINEURAL
Status: DISCONTINUED | OUTPATIENT
Start: 2024-09-06 | End: 2024-09-06 | Stop reason: HOSPADM

## 2024-09-06 RX ORDER — SODIUM CHLORIDE 9 MG/ML
INJECTION, SOLUTION INTRAVENOUS PRN
Status: DISCONTINUED | OUTPATIENT
Start: 2024-09-06 | End: 2024-09-06 | Stop reason: HOSPADM

## 2024-09-06 RX ORDER — ROCURONIUM BROMIDE 10 MG/ML
INJECTION, SOLUTION INTRAVENOUS PRN
Status: DISCONTINUED | OUTPATIENT
Start: 2024-09-06 | End: 2024-09-06 | Stop reason: SDUPTHER

## 2024-09-06 RX ORDER — KETOROLAC TROMETHAMINE 30 MG/ML
INJECTION, SOLUTION INTRAMUSCULAR; INTRAVENOUS PRN
Status: DISCONTINUED | OUTPATIENT
Start: 2024-09-06 | End: 2024-09-06 | Stop reason: SDUPTHER

## 2024-09-06 RX ORDER — SODIUM CHLORIDE, SODIUM LACTATE, POTASSIUM CHLORIDE, CALCIUM CHLORIDE 600; 310; 30; 20 MG/100ML; MG/100ML; MG/100ML; MG/100ML
INJECTION, SOLUTION INTRAVENOUS CONTINUOUS
Status: DISCONTINUED | OUTPATIENT
Start: 2024-09-06 | End: 2024-09-06 | Stop reason: HOSPADM

## 2024-09-06 RX ORDER — SODIUM CHLORIDE 0.9 % (FLUSH) 0.9 %
5-40 SYRINGE (ML) INJECTION EVERY 12 HOURS SCHEDULED
Status: DISCONTINUED | OUTPATIENT
Start: 2024-09-06 | End: 2024-09-06 | Stop reason: HOSPADM

## 2024-09-06 RX ORDER — OXYCODONE AND ACETAMINOPHEN 5; 325 MG/1; MG/1
1 TABLET ORAL EVERY 6 HOURS PRN
Qty: 20 TABLET | Refills: 0 | Status: SHIPPED | OUTPATIENT
Start: 2024-09-06 | End: 2024-09-13

## 2024-09-06 RX ORDER — ACETAMINOPHEN 500 MG
1000 TABLET ORAL ONCE
Status: COMPLETED | OUTPATIENT
Start: 2024-09-06 | End: 2024-09-06

## 2024-09-06 RX ORDER — ONDANSETRON 2 MG/ML
INJECTION INTRAMUSCULAR; INTRAVENOUS PRN
Status: DISCONTINUED | OUTPATIENT
Start: 2024-09-06 | End: 2024-09-06 | Stop reason: SDUPTHER

## 2024-09-06 RX ORDER — PROPOFOL 10 MG/ML
INJECTION, EMULSION INTRAVENOUS PRN
Status: DISCONTINUED | OUTPATIENT
Start: 2024-09-06 | End: 2024-09-06 | Stop reason: SDUPTHER

## 2024-09-06 RX ORDER — MIDAZOLAM HYDROCHLORIDE 2 MG/2ML
2 INJECTION, SOLUTION INTRAMUSCULAR; INTRAVENOUS
Status: DISCONTINUED | OUTPATIENT
Start: 2024-09-06 | End: 2024-09-06 | Stop reason: HOSPADM

## 2024-09-06 RX ADMIN — Medication 160 MG: at 11:59

## 2024-09-06 RX ADMIN — Medication 4 MG: at 12:59

## 2024-09-06 RX ADMIN — SODIUM CHLORIDE, POTASSIUM CHLORIDE, SODIUM LACTATE AND CALCIUM CHLORIDE: 600; 310; 30; 20 INJECTION, SOLUTION INTRAVENOUS at 11:18

## 2024-09-06 RX ADMIN — ROCURONIUM BROMIDE 40 MG: 10 INJECTION, SOLUTION INTRAVENOUS at 12:06

## 2024-09-06 RX ADMIN — ROCURONIUM BROMIDE 10 MG: 10 INJECTION, SOLUTION INTRAVENOUS at 11:59

## 2024-09-06 RX ADMIN — PROPOFOL 200 MG: 10 INJECTION, EMULSION INTRAVENOUS at 11:59

## 2024-09-06 RX ADMIN — GLYCOPYRROLATE 0.2 MG: 0.2 INJECTION INTRAMUSCULAR; INTRAVENOUS at 12:25

## 2024-09-06 RX ADMIN — PROPOFOL 100 MG: 10 INJECTION, EMULSION INTRAVENOUS at 13:02

## 2024-09-06 RX ADMIN — DEXAMETHASONE SODIUM PHOSPHATE 8 MG: 4 INJECTION INTRA-ARTICULAR; INTRALESIONAL; INTRAMUSCULAR; INTRAVENOUS; SOFT TISSUE at 12:59

## 2024-09-06 RX ADMIN — KETOROLAC TROMETHAMINE 30 MG: 30 INJECTION, SOLUTION INTRAMUSCULAR at 13:10

## 2024-09-06 RX ADMIN — SODIUM CHLORIDE, POTASSIUM CHLORIDE, SODIUM LACTATE AND CALCIUM CHLORIDE: 600; 310; 30; 20 INJECTION, SOLUTION INTRAVENOUS at 13:10

## 2024-09-06 RX ADMIN — FENTANYL CITRATE 50 MCG: 50 INJECTION, SOLUTION INTRAMUSCULAR; INTRAVENOUS at 12:13

## 2024-09-06 RX ADMIN — ROCURONIUM BROMIDE 10 MG: 10 INJECTION, SOLUTION INTRAVENOUS at 12:50

## 2024-09-06 RX ADMIN — PHENYLEPHRINE HYDROCHLORIDE 40 MCG/MIN: 10 INJECTION INTRAVENOUS at 12:31

## 2024-09-06 RX ADMIN — ONDANSETRON 4 MG: 2 INJECTION INTRAMUSCULAR; INTRAVENOUS at 12:59

## 2024-09-06 RX ADMIN — LIDOCAINE HYDROCHLORIDE 100 MG: 20 INJECTION, SOLUTION EPIDURAL; INFILTRATION; INTRACAUDAL; PERINEURAL at 11:59

## 2024-09-06 RX ADMIN — WATER 2000 MG: 1 INJECTION INTRAMUSCULAR; INTRAVENOUS; SUBCUTANEOUS at 12:14

## 2024-09-06 RX ADMIN — Medication 120 MCG: at 12:25

## 2024-09-06 RX ADMIN — FENTANYL CITRATE 50 MCG: 50 INJECTION, SOLUTION INTRAMUSCULAR; INTRAVENOUS at 11:59

## 2024-09-06 RX ADMIN — HYDROMORPHONE HYDROCHLORIDE 1 MG: 1 INJECTION, SOLUTION INTRAMUSCULAR; INTRAVENOUS; SUBCUTANEOUS at 13:30

## 2024-09-06 RX ADMIN — Medication 120 MCG: at 12:26

## 2024-09-06 RX ADMIN — Medication 80 MCG: at 12:28

## 2024-09-06 RX ADMIN — ACETAMINOPHEN 1000 MG: 500 TABLET ORAL at 11:18

## 2024-09-06 RX ADMIN — DEXMEDETOMIDINE HYDROCHLORIDE 12 MCG: 100 INJECTION, SOLUTION, CONCENTRATE INTRAVENOUS at 12:21

## 2024-09-06 RX ADMIN — Medication 80 MCG: at 12:27

## 2024-09-06 RX ADMIN — MIDAZOLAM 2 MG: 1 INJECTION INTRAMUSCULAR; INTRAVENOUS at 11:52

## 2024-09-06 RX ADMIN — GLYCOPYRROLATE 0.6 MG: 0.2 INJECTION INTRAMUSCULAR; INTRAVENOUS at 12:59

## 2024-09-06 NOTE — ANESTHESIA POSTPROCEDURE EVALUATION
Department of Anesthesiology  Postprocedure Note    Patient: Kareem Crain  MRN: [id number]  YOB: 1970  Date of evaluation: 9/6/2024    Procedure Summary       Date: 09/06/24 Room / Location: [facility] MAIN [address] MAIN OR    Anesthesia Start: 1152 Anesthesia Stop: 1330    Procedure: DIAGNOSTIC LAPAROSCOPY WITH BIOPSY OF MESENTERIC MASS (Abdomen) Diagnosis:       Nonspecific mesenteric lymphadenitis      (Nonspecific mesenteric lymphadenitis [I88.0])    Providers: Cornelio Collins MD Responsible Provider: Derek Mcbride MD    Anesthesia Type: General ASA Status: 2            Anesthesia Type: General    Loreta Phase I:      Loreta Phase II:      Anesthesia Post Evaluation    Patient location during evaluation: PACU  Patient participation: complete - patient participated  Level of consciousness: awake  Airway patency: patent  Nausea & Vomiting: no nausea  Cardiovascular status: blood pressure returned to baseline and hemodynamically stable  Respiratory status: acceptable  Hydration status: stable  Multimodal analgesia pain management approach    No notable events documented.

## 2024-09-06 NOTE — ANESTHESIA PRE PROCEDURE
Date/Time     08/05/2024 12:00 AM    K 3.8 08/05/2024 12:00 AM     08/05/2024 12:00 AM    CO2 26 08/05/2024 12:00 AM    BUN 14 08/05/2024 12:00 AM    CREATININE 1.26 08/05/2024 12:00 AM    GFRAA 81 01/24/2022 12:00 AM    AGRATIO 1.8 11/22/2022 12:00 AM    LABGLOM 67 08/05/2024 12:00 AM    LABGLOM 63 11/22/2022 12:00 AM    GLUCOSE 114 08/05/2024 12:00 AM    CALCIUM 9.7 08/05/2024 12:00 AM    BILITOT 0.7 08/05/2024 12:00 AM    ALKPHOS 125 08/05/2024 12:00 AM    AST 28 08/05/2024 12:00 AM    ALT 31 08/05/2024 12:00 AM       POC Tests: No results for input(s): \"POCGLU\", \"POCNA\", \"POCK\", \"POCCL\", \"POCBUN\", \"POCHEMO\", \"POCHCT\" in the last 72 hours.    Coags: No results found for: \"PROTIME\", \"INR\", \"APTT\"    HCG (If Applicable): No results found for: \"PREGTESTUR\", \"PREGSERUM\", \"HCG\", \"HCGQUANT\"     ABGs: No results found for: \"PHART\", \"PO2ART\", \"FMQ2EGQ\", \"NIL5EZW\", \"BEART\", \"O6UVZLHE\"     Type & Screen (If Applicable):  No results found for: \"ABORH\", \"LABANTI\"    Drug/Infectious Status (If Applicable):  No results found for: \"HIV\", \"HEPCAB\"    COVID-19 Screening (If Applicable): No results found for: \"COVID19\"        Anesthesia Evaluation  Patient summary reviewed and Nursing notes reviewed  Airway: Mallampati: II  TM distance: >3 FB   Neck ROM: full  Mouth opening: > = 3 FB   Dental: normal exam         Pulmonary:Negative Pulmonary ROS breath sounds clear to auscultation                             Cardiovascular:Negative CV ROS  Exercise tolerance: good (>4 METS)  (+) hypertension:        Rhythm: regular  Rate: normal                    Neuro/Psych:   Negative Neuro/Psych ROS              GI/Hepatic/Renal: Neg GI/Hepatic/Renal ROS            Endo/Other: Negative Endo/Other ROS                    Abdominal: normal exam            Vascular: negative vascular ROS.         Other Findings:       Anesthesia Plan      general     ASA 2       Induction: intravenous.    MIPS: Postoperative opioids intended and

## 2024-09-06 NOTE — OP NOTE
surgery were had with the patient.  He was in agreement to proceed.  Informed consent was obtained.    DESCRIPTION OF PROCEDURE:  After informed consent was obtained, the patient was brought back to the operating room.  He was placed under general endotracheal anesthesia in the supine position on the operating table.  His arms were extended bilaterally and a footboard was put in place.  His abdomen was then prepped and draped in usual sterile fashion.  A proper time-out was performed.  With this completed, we placed a Veress needle in the left upper quadrant just beneath the costal margin in the midclavicular line.  A standard waterdrop technique test was performed and the abdomen was then insufflated to 15 mmHg.  We then made a 5 mm incision 2 fingerbreadths to the left side of the umbilicus and slightly above the level of the umbilicus and tunneled in the abdomen at this site using a 5 mm Optiview trocar.  The abdomen was entered safely.  The Veress needle was inspected and noted without injury and this was removed.  Cursory evaluation of the abdomen revealed no obvious abnormal findings initially. We placed additional trocars including a right lateral 5 mm trocar, a right mid abdominal 12 mm trocar and a left lateral abdominal 5 mm trocar.  These were all just above the level of the umbilicus.  We then placed the patient in reverse Trendelenburg position.  The patient had some adhesions of the omentum inferiorly down to the sigmoid and perirectal fat and this was divided with a LigaSure device and this allowed us to elevate the omentum and transverse colon and expose the area of the ligament of Treitz.  This allowed us to visualize the proximal small-bowel mesentery. The patient had a few areas of what appeared to be lymphoid masses or enlarged lymph nodes in this area. Overlying the largest of these, I scored the small-bowel mesentery using a LigaSure device and exposed the lymph node.  Given that this was  adjacent to the proximal mesenteric vasculature, I took a portion of the lymph node using the LigaSure device altogether, approximately a 2 x 2 x 1 cm portion of the lymph node was taken.  This was done using the LigaSure device.  The specimen was passed into an EndoCatch bag and removed for pathology.  I took a portion of this lymph node and sent it for frozen section to confirm that we had a lesional tissue and this was confirmed. The remaining was sent fresh for lymphoma workup.  We placed a piece of Surgicel over the partially removed lymph node as this had been oozing and this obtained good hemostasis.  This was observed for several minutes to assure hemostasis. With this completed, I took a more thorough evaluation of the transverse colon and proximal small intestine to ensure there were no malignant-appearing lesions involving those and none were identified.  We then covered this area back with the mesentery of the transverse colon and omentum and returned the intra-abdominal contents back to their normal orientation.  The 12 mm trocar was removed and this was closed with the fascia using an 0 Vicryl suture on a suture passer.  The abdomen was then allowed to fully desufflate. The remaining trocars were removed.  Local anesthetic had been injected around all incision sites.  The skin incisions were all closed using 4-0 Monocryl subcuticular stitches followed by Dermabond and skin adhesive.  The patient was then awakened, extubated, and taken to postanesthesia care unit in stable condition.  All needle and instrument counts were correct at the completion the case.  I was present and scrubbed throughout the entire case.  There were no immediate complications.    DISPOSITION:  PACU.        MD ATA VANCE/LENA  D:  09/06/2024 13:42:43  T:  09/06/2024 17:39:44  JOB #:  [id number]

## 2024-09-06 NOTE — H&P
Date of Surgery Update:  Kareem Crain was seen and examined.  History and physical has been reviewed. The patient has been examined. There have been no significant clinical changes since the completion of the originally dated History and Physical.  Patient with mesenteric lymphadenopathy of unclear etiology not amenable to percutaneous biopsy.  Plan for laparoscopic biopsy of mesenteric masses/nodes as this does not appear completely resectable given proximal involvement of SMA/SMV.  A complete discussion of the risks, benefits and alternatives to surgery were discussed with the patient who was keen to proceed.       Signed By: Cornelio Collins MD     September 6, 2024 10:57 AM         Please note from the office and include the additional information below:    Past Medical History  Past Medical History:   Diagnosis Date    HTN (hypertension) 8/20/2014    Prediabetes         Past Surgical History  Past Surgical History:   Procedure Laterality Date    COLONOSCOPY          Social History  The patient Kareem Crain  reports that he has never smoked. He has never used smokeless tobacco. He reports current alcohol use of about 1.0 standard drink of alcohol per week. He reports that he does not use drugs.     Family History  Family History   Problem Relation Age of Onset    Hypertension Mother     Cerebral Aneurysm Father 62    Hypertension Father     Cerebral Aneurysm Paternal Aunt 50    Cerebral Aneurysm Paternal Aunt 60    Cancer Neg Hx     Anesth Problems Neg Hx           Cornelio Collins MD

## 2024-09-06 NOTE — BRIEF OP NOTE
Brief Postoperative Note      Patient: Kareem Crain  YOB: 1970  MRN: [id number]    Date of Procedure: 9/6/2024    Pre-Op Diagnosis Codes:     Mesenteric lymphadenopathy    Post-Op Diagnosis: Same       Procedure(s):  DIAGNOSTIC LAPAROSCOPY WITH BIOPSY OF MESENTERIC LYMPH NODE/MASS    Surgeon(s):  Cornelio Collins MD    Assistant:  Physician Assistant: Bernadine Nobles PA    Anesthesia: General    Estimated Blood Loss (mL): 20 mL    Complications: None    Specimens:   ID Type Source Tests Collected by Time Destination   1 : Mesenteric lymph node; Tissue Tissue SURGICAL PATHOLOGY Cornelio Collins MD 9/6/2024 1238    2 : Mesenteric lymph node biopsy #2 Tissue Tissue SURGICAL PATHOLOGY Cornelio Collins MD 9/6/2024 1246        Implants:  * No implants in log *      Drains: * No LDAs found *    Findings:  Infection Present At Time Of Surgery (PATOS) (choose all levels that have infection present):  No infection present  Other Findings: Enlarged and abnormal appearing lymph nodes within mesentery to proximal small bowel.   A portion of one of the enlarged keerthi masses was taken for pathology.  Normal appearing colon and small intestine in the surrounding area.      Electronically signed by Cornelio Collins MD on 9/6/2024 at 1:20 PM

## 2024-09-06 NOTE — DISCHARGE INSTRUCTIONS
Patient Discharge Instructions    Kareem Crain / [id number] : 1970    Admitted 2024 Discharged: 2024       It is important that you take the medication exactly as they are prescribed.   Keep your medication in the bottles provided by the pharmacist and keep a list of the medication names, dosages, and times to be taken in your wallet.   Do not take other medications without consulting your doctor.       What to do at Home    Recommended diet: Resume previous diet.      Recommended activity: No strenuous activity or heavy lifting (Less Than 15-20 Pounds) for 2 weeks.    No Driving While Taking narcotic pain medications.    You may take shower when you go home.  Do not submerge your incision(s) under water (pool, bath, hot tub) for at least 7 days.    If you experience any of the following symptoms Fevers, Chills, Nausea, Vomitting, Redness or Drainage at Surgical Site(s) or Any Other Questions or Concerns Please Call -  [phone].    Follow-up with Dr. Collins in ~14 days.        Information obtained by :  I understand that if any problems occur once I am at home I am to contact my physician.    I understand and acknowledge receipt of the instructions indicated above.                                                                                                                                           Physician's or R.N.'s Signature                                                                  Date/Time                                                                                                                                              Patient or Representative Signature                                                          Date/Time    ______________________________________________________________________    Anesthesia Discharge Instructions    After general anesthesia or intervenous sedation, for 24 hours or while taking prescription Narcotics:  Limit your activities  Do not drive

## 2024-09-16 ENCOUNTER — OFFICE VISIT (OUTPATIENT)
Age: 54
End: 2024-09-16
Payer: COMMERCIAL

## 2024-09-16 VITALS
BODY MASS INDEX: 34.38 KG/M2 | WEIGHT: 253.8 LBS | HEIGHT: 72 IN | OXYGEN SATURATION: 97 % | HEART RATE: 75 BPM | SYSTOLIC BLOOD PRESSURE: 151 MMHG | RESPIRATION RATE: 16 BRPM | TEMPERATURE: 97.3 F | DIASTOLIC BLOOD PRESSURE: 93 MMHG

## 2024-09-16 DIAGNOSIS — Z87.898 HISTORY OF FATIGUE: ICD-10-CM

## 2024-09-16 DIAGNOSIS — C81.03: Primary | ICD-10-CM

## 2024-09-16 PROCEDURE — 3077F SYST BP >= 140 MM HG: CPT | Performed by: INTERNAL MEDICINE

## 2024-09-16 PROCEDURE — 99214 OFFICE O/P EST MOD 30 MIN: CPT | Performed by: INTERNAL MEDICINE

## 2024-09-16 PROCEDURE — 3080F DIAST BP >= 90 MM HG: CPT | Performed by: INTERNAL MEDICINE

## 2024-09-19 ENCOUNTER — OFFICE VISIT (OUTPATIENT)
Age: 54
End: 2024-09-19

## 2024-09-19 VITALS
HEART RATE: 88 BPM | WEIGHT: 250 LBS | DIASTOLIC BLOOD PRESSURE: 78 MMHG | OXYGEN SATURATION: 96 % | SYSTOLIC BLOOD PRESSURE: 114 MMHG | HEIGHT: 72 IN | TEMPERATURE: 97.7 F | RESPIRATION RATE: 18 BRPM | BODY MASS INDEX: 33.86 KG/M2

## 2024-09-19 DIAGNOSIS — R59.0 LYMPHADENOPATHY, MESENTERIC: Primary | ICD-10-CM

## 2024-09-19 PROCEDURE — 99024 POSTOP FOLLOW-UP VISIT: CPT | Performed by: SURGERY

## 2024-09-19 ASSESSMENT — PATIENT HEALTH QUESTIONNAIRE - PHQ9
SUM OF ALL RESPONSES TO PHQ QUESTIONS 1-9: 0
1. LITTLE INTEREST OR PLEASURE IN DOING THINGS: NOT AT ALL
SUM OF ALL RESPONSES TO PHQ QUESTIONS 1-9: 0
2. FEELING DOWN, DEPRESSED OR HOPELESS: NOT AT ALL
SUM OF ALL RESPONSES TO PHQ QUESTIONS 1-9: 0
SUM OF ALL RESPONSES TO PHQ QUESTIONS 1-9: 0
SUM OF ALL RESPONSES TO PHQ9 QUESTIONS 1 & 2: 0

## 2024-09-24 ENCOUNTER — CLINICAL DOCUMENTATION (OUTPATIENT)
Age: 54
End: 2024-09-24

## 2024-10-18 ENCOUNTER — TELEPHONE (OUTPATIENT)
Age: 54
End: 2024-10-18

## 2024-10-18 NOTE — TELEPHONE ENCOUNTER
[facility] [place] at [facility]  [phone]    10/18/24 4:46 PM EDT - Attempted to reach the patient to advise Dr. Hamilton retired but we can place a referral to a different provider at [facility] if he is interested.  There was no answer.  Left a voicemail for the patient to call back at their earliest convenience along with the phone number to our office.

## 2024-10-18 NOTE — TELEPHONE ENCOUNTER
Patient called and stated that he would like a referral to get a second opinion to Dr. Daniel Hamilton at [facility]. He wanted to cancel his appt for Monday as well.     CB# [phone]

## 2024-10-21 ENCOUNTER — CLINICAL DOCUMENTATION (OUTPATIENT)
Age: 54
End: 2024-10-21

## 2024-10-21 NOTE — PROGRESS NOTES
[facility] [place] at [facility]  [phone]    10/21/24 9:48 AM EDT - Patient's imaging disc has been placed in outgoing mail.

## 2024-11-05 ENCOUNTER — TELEPHONE (OUTPATIENT)
Age: 54
End: 2024-11-05

## 2025-01-10 DIAGNOSIS — I10 ESSENTIAL (PRIMARY) HYPERTENSION: ICD-10-CM

## 2025-01-13 RX ORDER — LISINOPRIL AND HYDROCHLOROTHIAZIDE 12.5; 2 MG/1; MG/1
TABLET ORAL
Qty: 90 TABLET | Refills: 0 | Status: SHIPPED | OUTPATIENT
Start: 2025-01-13

## 2025-02-13 ENCOUNTER — OFFICE VISIT (OUTPATIENT)
Facility: CLINIC | Age: 55
End: 2025-02-13
Payer: COMMERCIAL

## 2025-02-13 VITALS
RESPIRATION RATE: 15 BRPM | TEMPERATURE: 97.8 F | BODY MASS INDEX: 35.21 KG/M2 | WEIGHT: 260 LBS | SYSTOLIC BLOOD PRESSURE: 147 MMHG | OXYGEN SATURATION: 97 % | HEART RATE: 76 BPM | HEIGHT: 72 IN | DIASTOLIC BLOOD PRESSURE: 90 MMHG

## 2025-02-13 DIAGNOSIS — L91.8 SKIN TAG: Primary | ICD-10-CM

## 2025-02-13 PROCEDURE — 3077F SYST BP >= 140 MM HG: CPT

## 2025-02-13 PROCEDURE — 3080F DIAST BP >= 90 MM HG: CPT

## 2025-02-13 PROCEDURE — 99212 OFFICE O/P EST SF 10 MIN: CPT

## 2025-02-13 SDOH — ECONOMIC STABILITY: FOOD INSECURITY: WITHIN THE PAST 12 MONTHS, YOU WORRIED THAT YOUR FOOD WOULD RUN OUT BEFORE YOU GOT MONEY TO BUY MORE.: NEVER TRUE

## 2025-02-13 SDOH — ECONOMIC STABILITY: FOOD INSECURITY: WITHIN THE PAST 12 MONTHS, THE FOOD YOU BOUGHT JUST DIDN'T LAST AND YOU DIDN'T HAVE MONEY TO GET MORE.: NEVER TRUE

## 2025-02-13 ASSESSMENT — ENCOUNTER SYMPTOMS
SHORTNESS OF BREATH: 0
RHINORRHEA: 0
ABDOMINAL PAIN: 0
SINUS PAIN: 0
COUGH: 0
SORE THROAT: 0
CONSTIPATION: 0
ALLERGIC/IMMUNOLOGIC NEGATIVE: 1
EYES NEGATIVE: 1
DIARRHEA: 0

## 2025-02-13 ASSESSMENT — PATIENT HEALTH QUESTIONNAIRE - PHQ9
SUM OF ALL RESPONSES TO PHQ QUESTIONS 1-9: 0
SUM OF ALL RESPONSES TO PHQ9 QUESTIONS 1 & 2: 0
2. FEELING DOWN, DEPRESSED OR HOPELESS: NOT AT ALL
1. LITTLE INTEREST OR PLEASURE IN DOING THINGS: NOT AT ALL
SUM OF ALL RESPONSES TO PHQ QUESTIONS 1-9: 0

## 2025-02-13 NOTE — PROGRESS NOTES
Chief Complaint   Patient presents with   • Lab Collection     STD Testing  Nodule on inguinal/scrotal crease   • Lymphoma     Nodular Lymphocite Hodkin's Lymophoma     Patient states consent for provider to use FLAVIO System for documentation of this visit.    \"Have you been to the ER, urgent care clinic since your last visit?  Hospitalized since your last visit?\"    NO    “Have you seen or consulted any other health care providers outside our system since your last visit?”         Hematology/Oncology         “Have you had a diabetic eye exam?”    NO     No diabetic eye exam on file                Kareem Crain (:  1970) is a 54 y.o. male, here for evaluation of the following chief complaint(s):  Lab Collection (STD Testing/Nodule on inguinal/scrotal crease) and Lymphoma (Nodular Lymphocite Hodkin's Lymophoma)        Subjective   History of Present Illness  The patient presents for evaluation of a bump on his groin.    He reports the presence of a groin bump, which he initially suspected to be an ingrown hair. The lesion, described as resembling a cauliflower in appearance and coloration, has been present for approximately 2 months. He also mentions a previous scrotal bump that has since resolved. He is not experiencing any associated pain or discharge from the current lesion. He admits to having unprotected sexual intercourse with a partner who may have been exposed to sexually transmitted infections. He reports no hematuria or penile discharge but does experience mild pain during ejaculation. He is not experiencing any urinary difficulties or erectile dysfunction. He occasionally experiences a slight tingling sensation during urination. He has previously undergone testing for hepatitis B, C, and HIV, all of which were negative. 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    Supplemental Information  He has been diagnosed with nodular lymphocyte predominant Hodgkin's lymphoma.    Review of

## 2025-02-13 NOTE — PROGRESS NOTES
Chief Complaint   Patient presents with    Lab Collection     STD Testing  Nodule on inguinal/scrotal crease    Lymphoma     Nodular Lymphocite Hodkin's Lymophoma     Patient states consent for provider to use FLAVIO System for documentation of this visit.    \"Have you been to the ER, urgent care clinic since your last visit?  Hospitalized since your last visit?\"    NO    “Have you seen or consulted any other health care providers outside our system since your last visit?”        [facility] Hematology/Oncology         “Have you had a diabetic eye exam?”    NO     No diabetic eye exam on file

## 2025-03-24 ENCOUNTER — OFFICE VISIT (OUTPATIENT)
Facility: CLINIC | Age: 55
End: 2025-03-24
Payer: COMMERCIAL

## 2025-03-24 VITALS
WEIGHT: 262 LBS | DIASTOLIC BLOOD PRESSURE: 80 MMHG | HEART RATE: 90 BPM | OXYGEN SATURATION: 96 % | TEMPERATURE: 98.2 F | RESPIRATION RATE: 14 BRPM | BODY MASS INDEX: 35.49 KG/M2 | SYSTOLIC BLOOD PRESSURE: 135 MMHG | HEIGHT: 72 IN

## 2025-03-24 DIAGNOSIS — Z00.01 ENCOUNTER FOR GENERAL ADULT MEDICAL EXAMINATION WITH ABNORMAL FINDINGS: Primary | ICD-10-CM

## 2025-03-24 DIAGNOSIS — R73.9 ELEVATED BLOOD SUGAR: ICD-10-CM

## 2025-03-24 DIAGNOSIS — R79.89 ELEVATED SERUM CREATININE: ICD-10-CM

## 2025-03-24 DIAGNOSIS — L91.8 SKIN TAG: ICD-10-CM

## 2025-03-24 LAB
ALBUMIN SERPL-MCNC: 3.8 G/DL (ref 3.5–5)
ALBUMIN/GLOB SERPL: 1.4 (ref 1.1–2.2)
ALP SERPL-CCNC: 119 U/L (ref 45–117)
ALT SERPL-CCNC: 38 U/L (ref 12–78)
ANION GAP SERPL CALC-SCNC: 6 MMOL/L (ref 2–12)
AST SERPL-CCNC: 26 U/L (ref 15–37)
BILIRUB SERPL-MCNC: 0.6 MG/DL (ref 0.2–1)
BUN SERPL-MCNC: 14 MG/DL (ref 6–20)
BUN/CREAT SERPL: 11 (ref 12–20)
CALCIUM SERPL-MCNC: 9.3 MG/DL (ref 8.5–10.1)
CHLORIDE SERPL-SCNC: 105 MMOL/L (ref 97–108)
CO2 SERPL-SCNC: 27 MMOL/L (ref 21–32)
CREAT SERPL-MCNC: 1.31 MG/DL (ref 0.7–1.3)
EST. AVERAGE GLUCOSE BLD GHB EST-MCNC: 134 MG/DL
GLOBULIN SER CALC-MCNC: 2.8 G/DL (ref 2–4)
GLUCOSE SERPL-MCNC: 152 MG/DL (ref 65–100)
HBA1C MFR BLD: 6.3 % (ref 4–5.6)
POTASSIUM SERPL-SCNC: 3.8 MMOL/L (ref 3.5–5.1)
PROT SERPL-MCNC: 6.6 G/DL (ref 6.4–8.2)
SODIUM SERPL-SCNC: 138 MMOL/L (ref 136–145)

## 2025-03-24 PROCEDURE — 3075F SYST BP GE 130 - 139MM HG: CPT

## 2025-03-24 PROCEDURE — 99213 OFFICE O/P EST LOW 20 MIN: CPT

## 2025-03-24 PROCEDURE — 3080F DIAST BP >= 90 MM HG: CPT

## 2025-03-24 PROCEDURE — 11200 RMVL SKIN TAGS UP TO&INC 15: CPT

## 2025-03-24 ASSESSMENT — ENCOUNTER SYMPTOMS
SHORTNESS OF BREATH: 0
SORE THROAT: 0
DIARRHEA: 0
RHINORRHEA: 0
ABDOMINAL PAIN: 0
COUGH: 0
EYES NEGATIVE: 1
CONSTIPATION: 0
SINUS PAIN: 0
ALLERGIC/IMMUNOLOGIC NEGATIVE: 1

## 2025-03-24 NOTE — PROGRESS NOTES
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  Hospitalized since your last visit?\"    NO    “Have you seen or consulted any other health care providers outside our system since your last visit?”        [facility] Hematology/Oncology        “Have you had a diabetic eye exam?”    NO     No diabetic eye exam on file            
effects of cryotherapy, including blistering, infection, and possible changes in skin color.  - Advised to keep the area clean and dry post-procedure and to avoid popping any blisters; instructed to inform the clinic if there is any discharge or pus.    2. Elevated blood glucose levels.  - Reports that glucose levels have been high.  - A1c test will be conducted to assess blood sugar control.  - Blood work will be drawn to evaluate current glucose levels.  - Monitoring for any symptoms of hyperglycemia.    3. Elevated creatinine levels.  - Reports that creatinine levels have been high.  - Kidney function tests will be performed to evaluate renal health.  - Blood work will be drawn to assess current creatinine levels.  - Monitoring for any symptoms of renal dysfunction.    Results    No follow-ups on file.  There are no diagnoses linked to this encounter.       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 male who presents for evalution.     Reviewed PmHx, RxHx, FmHx, SocHx, AllgHx and updated and dated in the chart.    CURRENT MEDS W/ ASSOC DIAG           Start Date End Date     lisinopril-hydroCHLOROthiazide (PRINZIDE;ZESTORETIC) 20-12.5 MG per tablet  01/13/25  --     TAKE 1 TABLET BY MOUTH EVERY DAY **NEED FOLLOW UP APPOINTMENT FOR REFILLS     Associated Diagnoses:  Essential (primary) hypertension             Patient Active Problem List   Diagnosis Code    Elevated blood sugar R73.9    Mixed hyperlipidemia E78.2    HTN (hypertension) I10    Controlled type 2 diabetes mellitus without complication, without long-term current use of insulin (HCC) E11.9    Other fatigue R53.83    Mesenteric mass K63.89    Mesenteric lymphadenopathy R59.0    History of fatigue Z87.898        Review of Systems - negative except as

## 2025-03-25 ENCOUNTER — RESULTS FOLLOW-UP (OUTPATIENT)
Facility: CLINIC | Age: 55
End: 2025-03-25

## 2025-03-25 ENCOUNTER — CLINICAL DOCUMENTATION (OUTPATIENT)
Facility: CLINIC | Age: 55
End: 2025-03-25

## 2025-03-25 NOTE — RESULT ENCOUNTER NOTE
Notify patient via Tintrit message      Cong Mr. Crain,    Attached are the results of your hemoglobin A1C test. As you know, this is a 3-month measurement of your blood glucose levels. This test is a much more accurate picture of your long-term sugar control, as compared to a spot glucose check. Your number was 6.3, prediabetes, which indicates excellent control. Our goal is to always be below 7, or as close to 7 as we can get. Please continue with our current treatment plan and keep up the good work! We will check on this again during our next routine follow-up.    Your metabolic panel which looks at your blood sugar, electrolytes, liver function and looks good.  Elevated creatinine level.  Recommend at your next appointment we check a vitamin D level as well as a microalbumin level.      Sincerely,  Lincoln

## 2025-03-25 NOTE — PROGRESS NOTES
Ancelmom to return call to office to schedule 3 month fu for A1c and check kidney function. Please schedule with Lincoln

## 2025-04-20 DIAGNOSIS — I10 ESSENTIAL (PRIMARY) HYPERTENSION: ICD-10-CM

## 2025-04-21 RX ORDER — LISINOPRIL AND HYDROCHLOROTHIAZIDE 12.5; 2 MG/1; MG/1
TABLET ORAL
Qty: 90 TABLET | Refills: 0 | Status: SHIPPED | OUTPATIENT
Start: 2025-04-21

## 2025-07-09 ENCOUNTER — OFFICE VISIT (OUTPATIENT)
Facility: CLINIC | Age: 55
End: 2025-07-09
Payer: COMMERCIAL

## 2025-07-09 VITALS
TEMPERATURE: 97.3 F | OXYGEN SATURATION: 98 % | WEIGHT: 261 LBS | HEART RATE: 67 BPM | DIASTOLIC BLOOD PRESSURE: 87 MMHG | RESPIRATION RATE: 16 BRPM | SYSTOLIC BLOOD PRESSURE: 143 MMHG | BODY MASS INDEX: 35.35 KG/M2 | HEIGHT: 72 IN

## 2025-07-09 DIAGNOSIS — L91.8 SKIN TAG: ICD-10-CM

## 2025-07-09 DIAGNOSIS — I15.9 SECONDARY HYPERTENSION: Primary | ICD-10-CM

## 2025-07-09 DIAGNOSIS — C81.03: ICD-10-CM

## 2025-07-09 DIAGNOSIS — E11.9 TYPE 2 DIABETES MELLITUS WITHOUT COMPLICATION, WITHOUT LONG-TERM CURRENT USE OF INSULIN (HCC): ICD-10-CM

## 2025-07-09 DIAGNOSIS — E78.2 MIXED HYPERLIPIDEMIA: ICD-10-CM

## 2025-07-09 DIAGNOSIS — E11.9 CONTROLLED TYPE 2 DIABETES MELLITUS WITHOUT COMPLICATION, WITHOUT LONG-TERM CURRENT USE OF INSULIN (HCC): ICD-10-CM

## 2025-07-09 PROCEDURE — 3044F HG A1C LEVEL LT 7.0%: CPT

## 2025-07-09 PROCEDURE — 3077F SYST BP >= 140 MM HG: CPT

## 2025-07-09 PROCEDURE — 99214 OFFICE O/P EST MOD 30 MIN: CPT

## 2025-07-09 PROCEDURE — 3079F DIAST BP 80-89 MM HG: CPT

## 2025-07-09 RX ORDER — LISINOPRIL AND HYDROCHLOROTHIAZIDE 20; 25 MG/1; MG/1
1 TABLET ORAL DAILY
Qty: 30 TABLET | Refills: 3 | Status: SHIPPED | OUTPATIENT
Start: 2025-07-09

## 2025-07-09 SDOH — ECONOMIC STABILITY: FOOD INSECURITY: WITHIN THE PAST 12 MONTHS, THE FOOD YOU BOUGHT JUST DIDN'T LAST AND YOU DIDN'T HAVE MONEY TO GET MORE.: NEVER TRUE

## 2025-07-09 SDOH — ECONOMIC STABILITY: FOOD INSECURITY: WITHIN THE PAST 12 MONTHS, YOU WORRIED THAT YOUR FOOD WOULD RUN OUT BEFORE YOU GOT MONEY TO BUY MORE.: NEVER TRUE

## 2025-07-09 ASSESSMENT — PATIENT HEALTH QUESTIONNAIRE - PHQ9
SUM OF ALL RESPONSES TO PHQ QUESTIONS 1-9: 0
2. FEELING DOWN, DEPRESSED OR HOPELESS: NOT AT ALL
SUM OF ALL RESPONSES TO PHQ QUESTIONS 1-9: 0
SUM OF ALL RESPONSES TO PHQ QUESTIONS 1-9: 0
1. LITTLE INTEREST OR PLEASURE IN DOING THINGS: NOT AT ALL
SUM OF ALL RESPONSES TO PHQ QUESTIONS 1-9: 0

## 2025-07-09 ASSESSMENT — ENCOUNTER SYMPTOMS
CONSTIPATION: 0
DIARRHEA: 0
EYES NEGATIVE: 1
ALLERGIC/IMMUNOLOGIC NEGATIVE: 1
SINUS PAIN: 0
SORE THROAT: 0
ABDOMINAL PAIN: 0
RHINORRHEA: 0
SHORTNESS OF BREATH: 0
COUGH: 0

## 2025-07-09 NOTE — PROGRESS NOTES
Chief Complaint   Patient presents with    Hypertension    Medication Refill    Skin Tag     Groin area       Patient states consent for provider to use FLAVIO System for documentation of this visit.    Have you been to the ER, urgent care clinic since your last visit?  Hospitalized since your last visit?   NO    Have you seen or consulted any other health care providers outside our system since your last visit?   [facility] Hematology/Oncology      “Have you had a diabetic eye exam?”    YES - Where: [facility] Nurse/CMA to request most recent records if not in the chart     No diabetic eye exam on file

## 2025-07-09 NOTE — PROGRESS NOTES
Chief Complaint   Patient presents with    Hypertension    Medication Refill    Skin Tag     Groin area       Patient states consent for provider to use FLAVIO System for documentation of this visit.    Have you been to the ER, urgent care clinic since your last visit?  Hospitalized since your last visit?   NO    Have you seen or consulted any other health care providers outside our system since your last visit?   [facility] Hematology/Oncology      “Have you had a diabetic eye exam?”    YES - [facility] to request most recent records if not in the chart     No diabetic eye exam on file                Kareem Crain (:  1970) is a 54 y.o. male, here for evaluation of the following chief complaint(s):  Hypertension, Medication Refill, and Skin Tag (Groin area)        Subjective   History of Present Illness  The patient presents for evaluation of skin tags and hypertension.    He reports recurrent skin tags in his groin area, which cause him distress upon each occurrence. He has observed that these skin tags tend to appear more frequently when he gains weight. Additionally, he has noticed the emergence of similar skin tags on his shoulder.    His blood pressure readings at home have been consistently around 140 systolic. He is not experiencing any chest pain or shortness of breath. He also reports no presence of lumps, bumps, tenderness, constipation, or diarrhea.    Review of Systems   Constitutional:  Negative for activity change, appetite change and fatigue.   HENT:  Negative for postnasal drip, rhinorrhea, sinus pain and sore throat.    Eyes: Negative.    Respiratory:  Negative for cough and shortness of breath.    Cardiovascular:  Negative for chest pain.   Gastrointestinal:  Negative for abdominal pain, constipation and diarrhea.   Endocrine: Negative.    Musculoskeletal:  Negative for arthralgias, joint swelling and myalgias.   Skin: Negative.

## 2025-07-13 ENCOUNTER — RESULTS FOLLOW-UP (OUTPATIENT)
Facility: CLINIC | Age: 55
End: 2025-07-13

## 2025-07-13 LAB
ALBUMIN SERPL-MCNC: 3.8 G/DL (ref 3.5–5)
ALBUMIN/GLOB SERPL: 1.2 (ref 1.1–2.2)
ALP SERPL-CCNC: 125 U/L (ref 45–117)
ALT SERPL-CCNC: 43 U/L (ref 12–78)
ANION GAP SERPL CALC-SCNC: 5 MMOL/L (ref 2–12)
AST SERPL-CCNC: 26 U/L (ref 15–37)
BILIRUB SERPL-MCNC: 0.6 MG/DL (ref 0.2–1)
BUN SERPL-MCNC: 19 MG/DL (ref 6–20)
BUN/CREAT SERPL: 15 (ref 12–20)
CALCIUM SERPL-MCNC: 9.3 MG/DL (ref 8.5–10.1)
CHLORIDE SERPL-SCNC: 102 MMOL/L (ref 97–108)
CO2 SERPL-SCNC: 28 MMOL/L (ref 21–32)
CREAT SERPL-MCNC: 1.3 MG/DL (ref 0.7–1.3)
EST. AVERAGE GLUCOSE BLD GHB EST-MCNC: 128 MG/DL
GLOBULIN SER CALC-MCNC: 3.3 G/DL (ref 2–4)
GLUCOSE SERPL-MCNC: 121 MG/DL (ref 65–100)
HBA1C MFR BLD: 6.1 % (ref 4–5.6)
POTASSIUM SERPL-SCNC: 3.9 MMOL/L (ref 3.5–5.1)
PROT SERPL-MCNC: 7.1 G/DL (ref 6.4–8.2)
SODIUM SERPL-SCNC: 135 MMOL/L (ref 136–145)

## 2025-07-21 DIAGNOSIS — I15.9 SECONDARY HYPERTENSION: ICD-10-CM

## 2025-07-21 RX ORDER — LISINOPRIL AND HYDROCHLOROTHIAZIDE 20; 25 MG/1; MG/1
1 TABLET ORAL DAILY
Qty: 90 TABLET | Refills: 1 | Status: SHIPPED | OUTPATIENT
Start: 2025-07-21

## (undated) DEVICE — SUTURE MONOCRYL + SZ 4-0 L27IN ABSRB UD L19MM PS-2 3/8 CIR MCP426H

## (undated) DEVICE — GENERAL LAPAROSCOPY - SMH: Brand: MEDLINE INDUSTRIES, INC.

## (undated) DEVICE — TROCAR: Brand: KII® OPTICAL ACCESS SYSTEM

## (undated) DEVICE — SEALER ONE-STEP 37CM LIGASURE MARYLAND XP

## (undated) DEVICE — SUTURE VICRYL + SZ 0 L27IN ABSRB VLT L26MM UR-6 5/8 CIR VCP603H

## (undated) DEVICE — X-RAY DETECTABLE SPONGES,16 PLY: Brand: VISTEC

## (undated) DEVICE — INSUFFLATION NEEDLE TO ESTABLISH PNEUMOPERITONEUM.: Brand: INSUFFLATION NEEDLE

## (undated) DEVICE — LAPAROSCOPIC TROCAR SLEEVE/SINGLE USE: Brand: KII® OPTICAL ACCESS SYSTEM

## (undated) DEVICE — GARMENT,MEDLINE,DVT,INT,CALF,MED, GEN2: Brand: MEDLINE

## (undated) DEVICE — GLOVE SURG SZ 8 L12IN FNGR THK79MIL GRN LTX FREE

## (undated) DEVICE — ELECTRODE ES 36CM LAP FLAT L HK COAT DISP CLEANCOAT

## (undated) DEVICE — LIQUIBAND RAPID ADHESIVE 36/CS 0.8ML: Brand: MEDLINE

## (undated) DEVICE — BAG SPEC REM 224ML W4XL6IN DIA10MM 1 HND GYN DISP ENDOPCH

## (undated) DEVICE — TROCAR: Brand: KII® SLEEVE

## (undated) DEVICE — HYPODERMIC SAFETY NEEDLE: Brand: MAGELLAN

## (undated) DEVICE — GLOVE ORANGE PI 7 1/2   MSG9075

## (undated) DEVICE — SYSTEM EVAC SMOKE LAPARSCOPIC

## (undated) DEVICE — 4-PORT MANIFOLD: Brand: NEPTUNE 2